# Patient Record
Sex: MALE | Race: WHITE | NOT HISPANIC OR LATINO | Employment: FULL TIME | URBAN - METROPOLITAN AREA
[De-identification: names, ages, dates, MRNs, and addresses within clinical notes are randomized per-mention and may not be internally consistent; named-entity substitution may affect disease eponyms.]

---

## 2019-03-14 ENCOUNTER — HOSPITAL ENCOUNTER (OUTPATIENT)
Facility: HOSPITAL | Age: 39
Discharge: HOME | End: 2019-03-16
Attending: EMERGENCY MEDICINE | Admitting: SURGERY
Payer: COMMERCIAL

## 2019-03-14 DIAGNOSIS — K35.209 ACUTE APPENDICITIS WITH GENERALIZED PERITONITIS, UNSPECIFIED WHETHER ABSCESS PRESENT, UNSPECIFIED WHETHER GANGRENE PRESENT, UNSPECIFIED WHETHER PERFORATION PRESENT: Primary | ICD-10-CM

## 2019-03-14 LAB
ANION GAP SERPL CALC-SCNC: 9 MMOL/L
BASOPHILS # BLD AUTO: 0.02 K/UL
BASOPHILS NFR BLD: 0.2 %
BILIRUB UR QL STRIP: NEGATIVE
BUN SERPL-MCNC: 15 MG/DL
CALCIUM SERPL-MCNC: 9.2 MG/DL
CHLORIDE SERPL-SCNC: 102 MMOL/L
CLARITY UR: CLEAR
CO2 SERPL-SCNC: 26 MMOL/L
COLOR UR: YELLOW
CREAT SERPL-MCNC: 0.9 MG/DL
DIFFERENTIAL METHOD: ABNORMAL
EOSINOPHIL # BLD AUTO: 0.2 K/UL
EOSINOPHIL NFR BLD: 2.1 %
ERYTHROCYTE [DISTWIDTH] IN BLOOD BY AUTOMATED COUNT: 12.5 %
EST. GFR  (AFRICAN AMERICAN): >60 ML/MIN/1.73 M^2
EST. GFR  (NON AFRICAN AMERICAN): >60 ML/MIN/1.73 M^2
GLUCOSE SERPL-MCNC: 104 MG/DL
GLUCOSE UR QL STRIP: NEGATIVE
HCT VFR BLD AUTO: 45.8 %
HGB BLD-MCNC: 16 G/DL
HGB UR QL STRIP: NEGATIVE
IMM GRANULOCYTES # BLD AUTO: 0.03 K/UL
IMM GRANULOCYTES NFR BLD AUTO: 0.3 %
KETONES UR QL STRIP: ABNORMAL
LEUKOCYTE ESTERASE UR QL STRIP: NEGATIVE
LYMPHOCYTES # BLD AUTO: 3.1 K/UL
LYMPHOCYTES NFR BLD: 27.8 %
MCH RBC QN AUTO: 28.3 PG
MCHC RBC AUTO-ENTMCNC: 34.9 G/DL
MCV RBC AUTO: 81 FL
MONOCYTES # BLD AUTO: 0.6 K/UL
MONOCYTES NFR BLD: 5.3 %
NEUTROPHILS # BLD AUTO: 7.1 K/UL
NEUTROPHILS NFR BLD: 64.3 %
NITRITE UR QL STRIP: NEGATIVE
NRBC BLD-RTO: 0 /100 WBC
PH UR STRIP: 6 [PH] (ref 5–8)
PLATELET # BLD AUTO: 227 K/UL
PMV BLD AUTO: 9.1 FL
POTASSIUM SERPL-SCNC: 3.6 MMOL/L
PROT UR QL STRIP: NEGATIVE
RBC # BLD AUTO: 5.65 M/UL
SODIUM SERPL-SCNC: 137 MMOL/L
SP GR UR STRIP: >=1.03 (ref 1–1.03)
URN SPEC COLLECT METH UR: ABNORMAL
UROBILINOGEN UR STRIP-ACNC: NEGATIVE EU/DL
WBC # BLD AUTO: 11.06 K/UL

## 2019-03-14 PROCEDURE — 81003 URINALYSIS AUTO W/O SCOPE: CPT

## 2019-03-14 PROCEDURE — 74176 CT RENAL STONE STUDY ABD PELVIS WO: ICD-10-PCS | Mod: 26,,, | Performed by: RADIOLOGY

## 2019-03-14 PROCEDURE — 74176 CT ABD & PELVIS W/O CONTRAST: CPT | Mod: TC

## 2019-03-14 PROCEDURE — 99285 EMERGENCY DEPT VISIT HI MDM: CPT

## 2019-03-14 PROCEDURE — 80048 BASIC METABOLIC PNL TOTAL CA: CPT

## 2019-03-14 PROCEDURE — G0378 HOSPITAL OBSERVATION PER HR: HCPCS

## 2019-03-14 PROCEDURE — 74176 CT ABD & PELVIS W/O CONTRAST: CPT | Mod: 26,,, | Performed by: RADIOLOGY

## 2019-03-14 PROCEDURE — 85025 COMPLETE CBC W/AUTO DIFF WBC: CPT

## 2019-03-14 RX ORDER — ONDANSETRON 2 MG/ML
4 INJECTION INTRAMUSCULAR; INTRAVENOUS EVERY 8 HOURS PRN
Status: DISCONTINUED | OUTPATIENT
Start: 2019-03-14 | End: 2019-03-16 | Stop reason: HOSPADM

## 2019-03-14 RX ORDER — SODIUM CHLORIDE, SODIUM LACTATE, POTASSIUM CHLORIDE, CALCIUM CHLORIDE 600; 310; 30; 20 MG/100ML; MG/100ML; MG/100ML; MG/100ML
INJECTION, SOLUTION INTRAVENOUS CONTINUOUS
Status: DISCONTINUED | OUTPATIENT
Start: 2019-03-14 | End: 2019-03-14

## 2019-03-14 RX ORDER — SODIUM CHLORIDE 0.9 % (FLUSH) 0.9 %
5 SYRINGE (ML) INJECTION
Status: DISCONTINUED | OUTPATIENT
Start: 2019-03-14 | End: 2019-03-16 | Stop reason: HOSPADM

## 2019-03-14 RX ORDER — MORPHINE SULFATE 4 MG/ML
4 INJECTION, SOLUTION INTRAMUSCULAR; INTRAVENOUS EVERY 4 HOURS PRN
Status: DISCONTINUED | OUTPATIENT
Start: 2019-03-14 | End: 2019-03-16 | Stop reason: HOSPADM

## 2019-03-14 RX ORDER — FAMOTIDINE 10 MG/ML
20 INJECTION INTRAVENOUS EVERY 12 HOURS
Status: DISCONTINUED | OUTPATIENT
Start: 2019-03-14 | End: 2019-03-16 | Stop reason: HOSPADM

## 2019-03-14 RX ORDER — SODIUM CHLORIDE AND POTASSIUM CHLORIDE 150; 900 MG/100ML; MG/100ML
INJECTION, SOLUTION INTRAVENOUS CONTINUOUS
Status: DISCONTINUED | OUTPATIENT
Start: 2019-03-15 | End: 2019-03-16 | Stop reason: HOSPADM

## 2019-03-15 ENCOUNTER — ANESTHESIA (OUTPATIENT)
Dept: SURGERY | Facility: HOSPITAL | Age: 39
End: 2019-03-15
Payer: COMMERCIAL

## 2019-03-15 ENCOUNTER — ANESTHESIA EVENT (OUTPATIENT)
Dept: SURGERY | Facility: HOSPITAL | Age: 39
End: 2019-03-15
Payer: COMMERCIAL

## 2019-03-15 PROCEDURE — 25000003 PHARM REV CODE 250: Performed by: ANESTHESIOLOGY

## 2019-03-15 PROCEDURE — 71000033 HC RECOVERY, INTIAL HOUR: Performed by: SURGERY

## 2019-03-15 PROCEDURE — 37000008 HC ANESTHESIA 1ST 15 MINUTES: Performed by: SURGERY

## 2019-03-15 PROCEDURE — 63600175 PHARM REV CODE 636 W HCPCS: Performed by: ANESTHESIOLOGY

## 2019-03-15 PROCEDURE — 96361 HYDRATE IV INFUSION ADD-ON: CPT | Performed by: EMERGENCY MEDICINE

## 2019-03-15 PROCEDURE — 96375 TX/PRO/DX INJ NEW DRUG ADDON: CPT | Performed by: EMERGENCY MEDICINE

## 2019-03-15 PROCEDURE — 88304 TISSUE EXAM BY PATHOLOGIST: CPT | Mod: 26,,, | Performed by: PATHOLOGY

## 2019-03-15 PROCEDURE — 96374 THER/PROPH/DIAG INJ IV PUSH: CPT | Performed by: EMERGENCY MEDICINE

## 2019-03-15 PROCEDURE — S0028 INJECTION, FAMOTIDINE, 20 MG: HCPCS | Performed by: EMERGENCY MEDICINE

## 2019-03-15 PROCEDURE — D9220A PRA ANESTHESIA: ICD-10-PCS | Mod: ANES,,, | Performed by: ANESTHESIOLOGY

## 2019-03-15 PROCEDURE — 36000709 HC OR TIME LEV III EA ADD 15 MIN: Performed by: SURGERY

## 2019-03-15 PROCEDURE — 88304 TISSUE SPECIMEN TO PATHOLOGY - SURGERY: ICD-10-PCS | Mod: 26,,, | Performed by: PATHOLOGY

## 2019-03-15 PROCEDURE — 27201423 OPTIME MED/SURG SUP & DEVICES STERILE SUPPLY: Performed by: SURGERY

## 2019-03-15 PROCEDURE — 25000003 PHARM REV CODE 250: Performed by: NURSE ANESTHETIST, CERTIFIED REGISTERED

## 2019-03-15 PROCEDURE — 44970 LAPAROSCOPY APPENDECTOMY: CPT | Mod: 22,,, | Performed by: SURGERY

## 2019-03-15 PROCEDURE — 36000708 HC OR TIME LEV III 1ST 15 MIN: Performed by: SURGERY

## 2019-03-15 PROCEDURE — D9220A PRA ANESTHESIA: Mod: CRNA,,, | Performed by: NURSE ANESTHETIST, CERTIFIED REGISTERED

## 2019-03-15 PROCEDURE — 63600175 PHARM REV CODE 636 W HCPCS: Performed by: INTERNAL MEDICINE

## 2019-03-15 PROCEDURE — G0378 HOSPITAL OBSERVATION PER HR: HCPCS

## 2019-03-15 PROCEDURE — 88304 TISSUE EXAM BY PATHOLOGIST: CPT | Performed by: PATHOLOGY

## 2019-03-15 PROCEDURE — 25000003 PHARM REV CODE 250: Performed by: SURGERY

## 2019-03-15 PROCEDURE — 37000009 HC ANESTHESIA EA ADD 15 MINS: Performed by: SURGERY

## 2019-03-15 PROCEDURE — 25000003 PHARM REV CODE 250: Performed by: EMERGENCY MEDICINE

## 2019-03-15 PROCEDURE — D9220A PRA ANESTHESIA: Mod: ANES,,, | Performed by: ANESTHESIOLOGY

## 2019-03-15 PROCEDURE — 63600175 PHARM REV CODE 636 W HCPCS: Performed by: EMERGENCY MEDICINE

## 2019-03-15 PROCEDURE — 63600175 PHARM REV CODE 636 W HCPCS: Performed by: NURSE ANESTHETIST, CERTIFIED REGISTERED

## 2019-03-15 PROCEDURE — 71000039 HC RECOVERY, EACH ADD'L HOUR: Performed by: SURGERY

## 2019-03-15 PROCEDURE — 44970 PR LAP,APPENDECTOMY: ICD-10-PCS | Mod: 22,,, | Performed by: SURGERY

## 2019-03-15 PROCEDURE — 96376 TX/PRO/DX INJ SAME DRUG ADON: CPT | Performed by: EMERGENCY MEDICINE

## 2019-03-15 PROCEDURE — 99204 OFFICE O/P NEW MOD 45 MIN: CPT | Mod: 57,,, | Performed by: SURGERY

## 2019-03-15 PROCEDURE — D9220A PRA ANESTHESIA: ICD-10-PCS | Mod: CRNA,,, | Performed by: NURSE ANESTHETIST, CERTIFIED REGISTERED

## 2019-03-15 PROCEDURE — 99204 PR OFFICE/OUTPT VISIT, NEW, LEVL IV, 45-59 MIN: ICD-10-PCS | Mod: 57,,, | Performed by: SURGERY

## 2019-03-15 RX ORDER — SODIUM CHLORIDE, SODIUM LACTATE, POTASSIUM CHLORIDE, CALCIUM CHLORIDE 600; 310; 30; 20 MG/100ML; MG/100ML; MG/100ML; MG/100ML
125 INJECTION, SOLUTION INTRAVENOUS CONTINUOUS
Status: DISCONTINUED | OUTPATIENT
Start: 2019-03-15 | End: 2019-03-16 | Stop reason: HOSPADM

## 2019-03-15 RX ORDER — ERTAPENEM 1 G/1
INJECTION, POWDER, LYOPHILIZED, FOR SOLUTION INTRAMUSCULAR; INTRAVENOUS
Status: DISCONTINUED | OUTPATIENT
Start: 2019-03-15 | End: 2019-03-15

## 2019-03-15 RX ORDER — MIDAZOLAM HYDROCHLORIDE 1 MG/ML
INJECTION, SOLUTION INTRAMUSCULAR; INTRAVENOUS
Status: DISCONTINUED | OUTPATIENT
Start: 2019-03-15 | End: 2019-03-15

## 2019-03-15 RX ORDER — SODIUM CHLORIDE 9 MG/ML
INJECTION, SOLUTION INTRAVENOUS CONTINUOUS PRN
Status: DISCONTINUED | OUTPATIENT
Start: 2019-03-15 | End: 2019-03-15

## 2019-03-15 RX ORDER — ONDANSETRON 2 MG/ML
4 INJECTION INTRAMUSCULAR; INTRAVENOUS DAILY PRN
Status: DISCONTINUED | OUTPATIENT
Start: 2019-03-15 | End: 2019-03-15 | Stop reason: SDUPTHER

## 2019-03-15 RX ORDER — MORPHINE SULFATE 4 MG/ML
2 INJECTION, SOLUTION INTRAMUSCULAR; INTRAVENOUS
Status: DISCONTINUED | OUTPATIENT
Start: 2019-03-15 | End: 2019-03-16 | Stop reason: HOSPADM

## 2019-03-15 RX ORDER — OXYCODONE AND ACETAMINOPHEN 5; 325 MG/1; MG/1
1 TABLET ORAL EVERY 4 HOURS PRN
Status: DISCONTINUED | OUTPATIENT
Start: 2019-03-15 | End: 2019-03-16 | Stop reason: HOSPADM

## 2019-03-15 RX ORDER — SUCCINYLCHOLINE CHLORIDE 20 MG/ML
INJECTION INTRAMUSCULAR; INTRAVENOUS
Status: DISCONTINUED | OUTPATIENT
Start: 2019-03-15 | End: 2019-03-15

## 2019-03-15 RX ORDER — PROMETHAZINE HYDROCHLORIDE 25 MG/ML
INJECTION, SOLUTION INTRAMUSCULAR; INTRAVENOUS
Status: DISPENSED
Start: 2019-03-15 | End: 2019-03-16

## 2019-03-15 RX ORDER — MEPERIDINE HYDROCHLORIDE 50 MG/ML
INJECTION INTRAMUSCULAR; INTRAVENOUS; SUBCUTANEOUS
Status: DISCONTINUED | OUTPATIENT
Start: 2019-03-15 | End: 2019-03-15

## 2019-03-15 RX ORDER — GLYCOPYRROLATE 0.2 MG/ML
INJECTION INTRAMUSCULAR; INTRAVENOUS
Status: DISCONTINUED | OUTPATIENT
Start: 2019-03-15 | End: 2019-03-15

## 2019-03-15 RX ORDER — MORPHINE SULFATE 4 MG/ML
2 INJECTION, SOLUTION INTRAMUSCULAR; INTRAVENOUS EVERY 5 MIN PRN
Status: COMPLETED | OUTPATIENT
Start: 2019-03-15 | End: 2019-03-15

## 2019-03-15 RX ORDER — BUPIVACAINE HYDROCHLORIDE AND EPINEPHRINE 5; 5 MG/ML; UG/ML
INJECTION, SOLUTION EPIDURAL; INTRACAUDAL; PERINEURAL
Status: DISCONTINUED | OUTPATIENT
Start: 2019-03-15 | End: 2019-03-15 | Stop reason: HOSPADM

## 2019-03-15 RX ORDER — SODIUM CHLORIDE, SODIUM LACTATE, POTASSIUM CHLORIDE, CALCIUM CHLORIDE 600; 310; 30; 20 MG/100ML; MG/100ML; MG/100ML; MG/100ML
INJECTION, SOLUTION INTRAVENOUS CONTINUOUS
Status: DISCONTINUED | OUTPATIENT
Start: 2019-03-15 | End: 2019-03-15

## 2019-03-15 RX ORDER — LIDOCAINE HYDROCHLORIDE 10 MG/ML
1 INJECTION, SOLUTION EPIDURAL; INFILTRATION; INTRACAUDAL; PERINEURAL ONCE
Status: DISCONTINUED | OUTPATIENT
Start: 2019-03-15 | End: 2019-03-15 | Stop reason: HOSPADM

## 2019-03-15 RX ORDER — CISATRACURIUM BESYLATE 2 MG/ML
INJECTION, SOLUTION INTRAVENOUS
Status: DISCONTINUED | OUTPATIENT
Start: 2019-03-15 | End: 2019-03-15

## 2019-03-15 RX ORDER — PROPOFOL 10 MG/ML
VIAL (ML) INTRAVENOUS
Status: DISCONTINUED | OUTPATIENT
Start: 2019-03-15 | End: 2019-03-15

## 2019-03-15 RX ORDER — DIPHENHYDRAMINE HYDROCHLORIDE 50 MG/ML
12.5 INJECTION INTRAMUSCULAR; INTRAVENOUS
Status: DISCONTINUED | OUTPATIENT
Start: 2019-03-15 | End: 2019-03-15 | Stop reason: HOSPADM

## 2019-03-15 RX ORDER — ONDANSETRON 2 MG/ML
4 INJECTION INTRAMUSCULAR; INTRAVENOUS EVERY 6 HOURS PRN
Status: DISCONTINUED | OUTPATIENT
Start: 2019-03-15 | End: 2019-03-16 | Stop reason: HOSPADM

## 2019-03-15 RX ADMIN — ERTAPENEM SODIUM 1 G: 1 INJECTION, POWDER, LYOPHILIZED, FOR SOLUTION INTRAMUSCULAR; INTRAVENOUS at 11:03

## 2019-03-15 RX ADMIN — POTASSIUM CHLORIDE AND SODIUM CHLORIDE 1000 ML: 900; 150 INJECTION, SOLUTION INTRAVENOUS at 01:03

## 2019-03-15 RX ADMIN — OXYCODONE HYDROCHLORIDE AND ACETAMINOPHEN 1 TABLET: 5; 325 TABLET ORAL at 02:03

## 2019-03-15 RX ADMIN — MORPHINE SULFATE 2 MG: 4 INJECTION INTRAVENOUS at 01:03

## 2019-03-15 RX ADMIN — OXYCODONE HYDROCHLORIDE AND ACETAMINOPHEN 1 TABLET: 5; 325 TABLET ORAL at 07:03

## 2019-03-15 RX ADMIN — MORPHINE SULFATE 2 MG: 4 INJECTION INTRAVENOUS at 02:03

## 2019-03-15 RX ADMIN — PROMETHAZINE HYDROCHLORIDE 6.25 MG: 25 INJECTION INTRAMUSCULAR; INTRAVENOUS at 01:03

## 2019-03-15 RX ADMIN — ERTAPENEM SODIUM 1 G: 1 INJECTION, POWDER, LYOPHILIZED, FOR SOLUTION INTRAMUSCULAR; INTRAVENOUS at 01:03

## 2019-03-15 RX ADMIN — GLYCOPYRROLATE 0.4 MG: 0.2 INJECTION INTRAMUSCULAR; INTRAVENOUS at 11:03

## 2019-03-15 RX ADMIN — FAMOTIDINE 20 MG: 10 INJECTION INTRAVENOUS at 08:03

## 2019-03-15 RX ADMIN — FAMOTIDINE 20 MG: 10 INJECTION INTRAVENOUS at 09:03

## 2019-03-15 RX ADMIN — SODIUM CHLORIDE: 0.9 INJECTION, SOLUTION INTRAVENOUS at 11:03

## 2019-03-15 RX ADMIN — PROPOFOL 200 MG: 10 INJECTION, EMULSION INTRAVENOUS at 11:03

## 2019-03-15 RX ADMIN — SODIUM CHLORIDE, POTASSIUM CHLORIDE, SODIUM LACTATE AND CALCIUM CHLORIDE: 600; 310; 30; 20 INJECTION, SOLUTION INTRAVENOUS at 12:03

## 2019-03-15 RX ADMIN — FAMOTIDINE 20 MG: 10 INJECTION INTRAVENOUS at 01:03

## 2019-03-15 RX ADMIN — MEPERIDINE HYDROCHLORIDE 50 MG: 50 INJECTION INTRAMUSCULAR; INTRAVENOUS; SUBCUTANEOUS at 11:03

## 2019-03-15 RX ADMIN — MIDAZOLAM HYDROCHLORIDE 2 MG: 1 INJECTION, SOLUTION INTRAMUSCULAR; INTRAVENOUS at 11:03

## 2019-03-15 RX ADMIN — CISATRACURIUM BESYLATE 6 MG: 2 INJECTION INTRAVENOUS at 11:03

## 2019-03-15 RX ADMIN — POTASSIUM CHLORIDE AND SODIUM CHLORIDE 1000 ML: 900; 150 INJECTION, SOLUTION INTRAVENOUS at 02:03

## 2019-03-15 RX ADMIN — SUCCINYLCHOLINE CHLORIDE 100 MG: 20 INJECTION, SOLUTION INTRAMUSCULAR; INTRAVENOUS at 11:03

## 2019-03-15 NOTE — NURSING
Pt resting at this time.  MD at bedside.  VSS, NAD.  Pt without complaints or changes in status.  Remains NPO for procedure this am.

## 2019-03-15 NOTE — PLAN OF CARE
Problem: Adult Inpatient Plan of Care  Goal: Plan of Care Review   03/15/19 0201   Plan of Care Review   Plan of Care Reviewed With patient

## 2019-03-15 NOTE — OP NOTE
[facility] - Periop Services  Operative Note     SUMMARY     Surgery Date: 3/15/2019     Surgeon(s) and Role:     * Zeferino Qureshi MD - Primary    Assistant: Christiano    Pre-op Diagnosis:  Acute appendicitis with generalized peritonitis without gangrene, unspecified whether abscess present, unspecified whether perforation present [K35.20]    Post-op Diagnosis:  Post-Op Diagnosis Codes:     * Acute appendicitis with generalized peritonitis without gangrene, unspecified whether abscess present, unspecified whether perforation present [K35.20]    Procedure(s) (LRB):  APPENDECTOMY, LAPAROSCOPIC (Right)    Modifier 22: This case was significantly harder than 75% of other appendectomies.  Acute on chronic appendicitis with significant retroperitoneal inflammation and scarring and the retrocecal nature of the base of the appendix caused this case to be increased time and more meticulous dissection.  Therefore I am adding the 22 modifier.    Description of the findings of the procedure:  Acute on chronic appendicitis with periappendicitis with retrocecal nature of the appendicial base.      Estimated Blood Loss: 25cc         Specimens:   Specimen (12h ago, onward)    Start     Ordered    03/15/19 1223  Specimen to Pathology - Surgery  Once     Comments:  Pre-op Diagnosis: Acute appendicitis with generalized peritonitis without gangrene, unspecified whether abscess present, unspecified whether perforation present [K35.20]Post-op Diagnosis: same Procedure(s):APPENDECTOMY, LAPAROSCOPIC Number of specimens: 1Name of specimens: 1. appendix     Start Status   03/15/19 1223 Collected (03/15/19 1224)       03/15/19 1223        Abx: Invanz 1 gram IV    Anesthesia: General, please see separate anesthesia note    DATE OF PROCEDURE:  03/15/2019.    INDICATIONS FOR PROCEDURE:  Mr. Dimas is a 38-year-old male who presented  to the ER last night with evidence of right lower quadrant pain for less  than 24-hour  duration.  CT scan confirmed appendicitis.  The patient was  admitted to the Hospital Medicine coffey and the Medical Team treated with IV  fluids, antibiotics and I was the surgeon on duty this morning, was called  in consultation.  Risks and benefits of laparoscopic versus open  appendectomy were discussed in detail.  The patient understood risks and  benefits and wished to proceed today by signing informed consent.    PROCEDURE IN DETAIL:  The patient was seen in preoperative holding.  All  questions were answered.  The patient was taken back to the Operative Room,  placed on the table in supine position.  General anesthesia was introduced  via endotracheal tube per Anesthesia, please see separate operative record.   The patient's left arm was tucked.  OG tube was placed.  Steven was placed.   The patient's abdomen was prepped and draped in standard sterile surgical  fashion.    I then instilled 10 mL of 0.25% Marcaine with epinephrine in the patient's  proposed surgical incision sites.  A curvilinear incision was made around  the superior umbilicus.  Skin incision was carried down to the fascia with  Bovie electrocautery.  Towel clips were used to elevate the umbilical  stalk.  Fascia was incised superiorly in a vertical fashion.  Finger sweep  was done after fascial entry in the abdominal wall and had no evidence of  adhesions to the anterior abdominal wall.  Ezekiel trocar was then placed.   Insufflation was achieved to 15 mmHg without incident of bradycardia from  the patient.  The patient was then placed in a head down, left lateral row  position.  Two additional 5 mm trocars were placed in the left lower  quadrant under direct visualization.  The small bowel was swept out of the  pelvis.  The appendix was visualized.  It was on the right pericolic gutter  with significant acute on chronic inflammatory changes to it.    The tip of the appendix was then grasped with a bowel grasper.  It was  elevated.  Meticulous  dissection was done of the mesoappendix with a  LigaSure device.  The appendix was then visualized.  The appendix was  dissected from the mesoappendix with LigaSure device.  The right colon at  the cecum was mobilized from the white line of Toldt given the significant  inflammatory changes to the appendix and needing more mobilization for  identification of the appendiceal base of the cecum.  Continued meticulous  dissection was performed of the mesoappendix from the appendix and its  chronic inflammatory changes to the right pericolic gutter as well as acute  inflammatory changes.  Eventually, the base of the appendix was noted.   This was a retrocecal location.  The cecum was mobilized more to allow for  adequate visualization of the base of the appendix.  The base of the  appendix was cleaned off with the LigaSure device.  At this point, a purple  load triload stapler was used, 45 mm placed on the base of the appendix and  fired.  The appendix was then placed in an EndoCatch bag and pulled out  through the umbilicus and handed off as specimen.  Attention was then  turned towards the appendiceal base staple line.  This appeared well  intact.  No evidence of hemorrhage from the staple line.  No evidence of  staple line dehiscence.  The right lower quadrant was washed out with   irrigant copiously.  The pelvis was then visualized.  There was no fluid  within the pelvis.  Insufflation was then released from the abdomen.   Ezekiel trocar and 5 mm trocars were removed.  The 5 mm trocars were removed  under direct visualization.  There was no hemorrhage from the trocar sites.   Ezekiel trocar was then removed.  The patient was then turned in the normal  anatomic position with the head up and back to supine position without a  roll.  A towel clip was then placed back on the umbilical stalk.  The  umbilical stalk was elevated.  Fascia was identified at the Ezekiel site.  0  Vicryl suture was used in a simple interrupted  fashion to close the fascia  with adequate elevation with the towel clip.  All sutures were placed and  tied down to the midline.  After all sutures were placed, approximately 5  to 6 sutures placed.  The fascia was well approximated without evidence of  hernia.  The umbilical site was then washed out with  irrigant.  A 3-0  Vicryl suture was used in simple subdermal fashion to close the subdermis  in a simple interrupted fashion, 3-0 Vicryl sutures were used at the 5 mm  trocar sites to close the skin.  A 4-0 Vicryl suture was used in a running  subcuticular fashion to close the umbilical port sites, epidermis.   Dermabond was placed over top of all surgical sites as a dressing.  The  patient tolerated the procedure well, extubated, sent from the OR after  reversed from general anesthesia. Steven was removed.  OG tube was removed.   The patient was transferred back to the Postoperative Care Unit in stable  condition.  All counts were correct at the end of the procedure including  lap pads, instruments as well as needles.  Plan will be for readmission to  the floor, continue IV fluids, antibiotics for the next 24 hours.  Possible  discharge home, pending clinical status.          SDR/IN dd: 03/15/2019 12:59:22 (CDT)   td: 03/15/2019 13:33:46 (CDT)  Doc ID #[id number]   Job ID #[id number]    CC:            [id number]

## 2019-03-15 NOTE — PROGRESS NOTES
[facility] Medicine  Progress Note    Patient Name: Elpidio Dimas  MRN: [id number]  Patient Class: OP- Observation   Admission Date: 3/14/2019  Length of Stay: 0 days  Attending Physician: Itz Dalal MD  Primary Care Provider: Itz Dalal MD        Subjective:     Principal Problem:Acute appendicitis with generalized peritonitis    HPI:  Awoke this am with hard cramp rlq which has improved now 3/10     Hospital Course:  ivfs iv pain meds surgery consult  03/15/2019.  Patient has been placed on the surgical schedule will have a laparoscopic appendectomy later today.  Patient on IV antibiotics CT scan x-rays and labs have been reviewed.  Discussed with patient and wife timing of surgical procedure today.  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  Depending on surgical findings at the time of surgery whether not the patient will be held over tonight to receive continued antibiotics.    Interval History:  Patient has done well over the p.m..  He is on the surgical schedule today to follow.  Patient is sitting in bed resting quietly his wife is in the room with him we discussed upcoming surgery.  Answered all questions for the patient and the wife and questions concerning work.  Continue current level of care unless clinical course changes.    Review of Systems   Constitutional: Positive for appetite change. Negative for activity change, chills, diaphoresis, fatigue, fever and unexpected weight change.   HENT: Negative for congestion, drooling, hearing loss and trouble swallowing.    Eyes: Negative for pain and visual disturbance.   Respiratory: Negative.  Negative for apnea, cough, choking, chest tightness, shortness of breath and wheezing.    Cardiovascular: Negative for chest pain, palpitations and leg  swelling.   Gastrointestinal: Positive for abdominal pain. Negative for abdominal distention, blood in stool, constipation, diarrhea, nausea and vomiting.        Rlq abd pain over appendix   Endocrine: Negative for polydipsia, polyphagia and polyuria.   Genitourinary: Negative for difficulty urinating, dysuria and flank pain.   Musculoskeletal: Negative for arthralgias, back pain, gait problem, joint swelling, neck pain and neck stiffness.   Skin: Negative for color change, rash and wound.   Allergic/Immunologic: Negative for environmental allergies, food allergies and immunocompromised state.   Neurological: Negative for dizziness, syncope, weakness, numbness and headaches.   Hematological: Negative for adenopathy.   Psychiatric/Behavioral: Negative for agitation, confusion and sleep disturbance. The patient is not nervous/anxious.      Objective:     Vital Signs (Most Recent):  Temp: 97.2 °F (36.2 °C) (03/15/19 0730)  Pulse: 65 (03/15/19 0730)  Resp: 18 (03/15/19 0730)  BP: (!) 162/93 (03/15/19 0730)  SpO2: 99 % (03/15/19 0730) Vital Signs (24h Range):  Temp:  [96.8 °F (36 °C)-99.7 °F (37.6 °C)] 97.2 °F (36.2 °C)  Pulse:  [51-74] 65  Resp:  [18] 18  SpO2:  [97 %-99 %] 99 %  BP: (121-162)/(68-97) 162/93     Weight: 129.9 kg (286 lb 6 oz)  Body mass index is 39.94 kg/m².    Intake/Output Summary (Last 24 hours) at 3/15/2019 0761  Last data filed at 3/15/2019 0546  Gross per 24 hour   Intake 600 ml   Output --   Net 600 ml      Physical Exam   Constitutional: He is oriented to person, place, and time. He appears well-developed and well-nourished.   HENT:   Head: Normocephalic and atraumatic.   Right Ear: External ear normal.   Left Ear: External ear normal.   Nose: Nose normal.   Eyes: Conjunctivae, EOM and lids are normal. Pupils are equal, round, and reactive to light.   Neck: Trachea normal, normal range of motion and full passive range of motion without pain. Neck supple.   Cardiovascular: Normal rate, regular  rhythm, S1 normal, S2 normal, normal heart sounds, intact distal pulses and normal pulses.   Pulmonary/Chest: Effort normal and breath sounds normal.   Abdominal: Soft. Normal aorta and bowel sounds are normal. There is tenderness. There is rebound and guarding.   RLQ   Musculoskeletal: Normal range of motion.   Neurological: He is alert and oriented to person, place, and time. He has normal reflexes.   Skin: Skin is warm, dry and intact.   Psychiatric: He has a normal mood and affect. His speech is normal and behavior is normal. Judgment and thought content normal. Cognition and memory are normal.   Nursing note and vitals reviewed.      Significant Labs:   BMP:   Recent Labs   Lab 03/14/19 2006         K 3.6      CO2 26   BUN 15   CREATININE 0.9   CALCIUM 9.2     CBC:   Recent Labs   Lab 03/14/19 2006   WBC 11.06   HGB 16.0   HCT 45.8        All pertinent labs within the past 24 hours have been reviewed.    Significant Imaging: I have reviewed and interpreted all pertinent imaging results/findings within the past 24 hours.    Assessment/Plan:      * Acute appendicitis with generalized peritonitis    ivfs iv pain [facility] surgery consult  03/15/2019.  Patient is awaiting surgery at this time.  All questions for the patient and wife were answered.  Surgical findings will determine if the patient stays overnight for continued antibiotics.  Continue current level of care unless clinical course changes.         VTE Risk Mitigation (From admission, onward)        Ordered     IP VTE LOW RISK PATIENT  Once      03/14/19 2256     Place RANDOLPH hose  Until discontinued      03/14/19 2256              Itz Dalal MD  Department of Hospital Medicine   [facility] - Med Surg

## 2019-03-15 NOTE — BRIEF OP NOTE
[facility] [place] [facility] - Periop Services  Brief Operative Note     SUMMARY     Surgery Date: 3/15/2019     Surgeon(s) and Role:     * Zeferino Qureshi MD - Primary    Assistant: Christiano    Pre-op Diagnosis:  Acute appendicitis with generalized peritonitis without gangrene, unspecified whether abscess present, unspecified whether perforation present [K35.20]    Post-op Diagnosis:  Post-Op Diagnosis Codes:     * Acute appendicitis with generalized peritonitis without gangrene, unspecified whether abscess present, unspecified whether perforation present [K35.20]    Procedure(s) (LRB):  APPENDECTOMY, LAPAROSCOPIC (Right)    Modifier 22: This case was significantly harder than 75% of other appendectomies.  Acute on chronic appendicitis with significant retroperitoneal inflammation and scarring and the retrocecal nature of the base of the appendix caused this case to be increased time and more meticulous dissection.  Therefore I am adding the 22 modifier.    Description of the findings of the procedure:  Acute on chronic appendicitis with periappendicitis with retrocecal nature of the appendicial base.      Estimated Blood Loss: 25cc         Specimens:   Specimen (12h ago, onward)    Start     Ordered    03/15/19 1223  Specimen to Pathology - Surgery  Once     Comments:  Pre-op Diagnosis: Acute appendicitis with generalized peritonitis without gangrene, unspecified whether abscess present, unspecified whether perforation present [K35.20]Post-op Diagnosis: same Procedure(s):APPENDECTOMY, LAPAROSCOPIC Number of specimens: 1Name of specimens: 1. appendix     Start Status   03/15/19 1223 Collected (03/15/19 1224)       03/15/19 1223        Abx: Invanz 1 gram IV    Anesthesia: General, please see separate anesthesia note

## 2019-03-15 NOTE — HOSPITAL COURSE
ivfs iv pain meds surgery consult  03/15/2019.  Patient has been placed on the surgical schedule will have a laparoscopic appendectomy later today.  Patient on IV antibiotics CT scan x-rays and labs have been reviewed.  Discussed with patient and wife timing of surgical procedure today.  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  Depending on surgical findings at the time of surgery whether not the patient will be held over tonight to receive continued antibiotics.  03/16/2019.  Patient is stable afebrile having no problems.  Has had diet.  Can be discharged home today follow up in office with Dr. FALL General surgery.  and with me.

## 2019-03-15 NOTE — ANESTHESIA PREPROCEDURE EVALUATION
03/15/2019  Elpidio Dimas is a 38 y.o., male.    Pre-op Assessment    I have reviewed the Patient Summary Reports.     I have reviewed the Nursing Notes.   I have reviewed the Medications.     Review of Systems  Anesthesia Hx:  No problems with previous Anesthesia    Social:  Non-Smoker    Hematology/Oncology:  Hematology Normal   Oncology Normal     EENT/Dental:EENT/Dental Normal   Cardiovascular:  Cardiovascular Normal     Pulmonary:  Pulmonary Normal    Renal/:  Renal/ Normal     Hepatic/GI:  Hepatic/GI Normal    Musculoskeletal:  Musculoskeletal Normal    Neurological:  Neurology Normal    Psych:  Psychiatric Normal           Physical Exam  General:  Well nourished    Airway/Jaw/Neck:  Airway Findings: Mouth Opening: Normal Tongue: Normal  General Airway Assessment: Adult  Mallampati: II       Chest/Lungs:  Chest/Lungs Findings: Clear to auscultation     Heart/Vascular:  Heart Findings: Rate: Normal        Mental Status:  Mental Status Findings:  Cooperative, Alert and Oriented         Anesthesia Plan  Type of Anesthesia, risks & benefits discussed:  Anesthesia Type:  general  Patient's Preference:   Intra-op Monitoring Plan: standard ASA monitors  Intra-op Monitoring Plan Comments:   Post Op Pain Control Plan:   Post Op Pain Control Plan Comments:   Induction:    Beta Blocker:  Patient is not currently on a Beta-Blocker (No further documentation required).       Informed Consent: Patient understands risks and agrees with Anesthesia plan.  Questions answered. Anesthesia consent signed with patient.  ASA Score: 2     Day of Surgery Review of History & Physical: I have interviewed and examined the patient. I have reviewed the patient's H&P dated:

## 2019-03-15 NOTE — PLAN OF CARE
1415- transported to floor per bed. assisted with transfer to bed per stand and turn. Tolerated activity well. Continued pain management as needed. Report given to Jina Espinoza RN .

## 2019-03-15 NOTE — PLAN OF CARE
03/15/19 1530   Discharge Assessment   Assessment Type Discharge Planning Assessment   Confirmed/corrected address and phone number on facesheet? Yes   Assessment information obtained from? Patient   Expected Length of Stay (days) 2   Communicated expected length of stay with patient/caregiver yes   Prior to hospitilization cognitive status: Alert/Oriented   Prior to hospitalization functional status: Independent   Current cognitive status: Alert/Oriented   Current Functional Status: Independent   Lives With spouse;child(denzel), dependent   Able to Return to Prior Arrangements yes   Is patient able to care for self after discharge? Yes   Who are your caregiver(s) and their phone number(s)? Yoselin Dimas spouse [phone]   Patient's perception of discharge disposition home or selfcare   Readmission Within the Last 30 Days no previous admission in last 30 days   Patient currently being followed by outpatient case management? No   Patient currently receives any other outside agency services? No   Equipment Currently Used at Home none   Do you have any problems affording any of your prescribed medications? No   Is the patient taking medications as prescribed? yes   Does the patient have transportation home? Yes   Transportation Anticipated family or friend will provide   Does the patient receive services at the Coumadin Clinic? No   Discharge Plan A Home with family   DME Needed Upon Discharge  none   Patient/Family in Agreement with Plan yes   Patient lives at home with his wife & 6yr old daughter. Patient is independent at home. Wife states she will be at home to help him out. Denies any discharge needs at this time.

## 2019-03-15 NOTE — PLAN OF CARE
Report from Bharati HWANG for continued care of quietly resting pt on room air. Vss surgical lap sites x 3 visualized unchanged.WEll approximated with no drainage

## 2019-03-15 NOTE — DISCHARGE INSTRUCTIONS
After an Appendectomy     You will start walking very soon after surgery. Walking helps you recover faster and helps prevent complications.    Most people recover quickly after an appendectomy. You will likely be in the hospital for 1 to 2 days. If your appendix burst, you may stay longer. After you return home, plan on a follow-up visit with your healthcare provider in 1 to 2 weeks, or as instructed.  In the hospital  In most cases, you will drink liquids and walk on the day of the surgery. You will also be given pain medicine. To help keep your lungs clear, you may be taught breathing exercises.  Back at home  Take your medicines as directed. They can help control pain from the surgery. Avoid strenuous activity, heavy lifting, and driving until your surgeon says it is OK. As directed, slowly go back to your normal activities in 7 to 10 days.  When to call your provider  Call your healthcare provider if you have any of the following:  · Swelling, oozing, worsening pain, or abnormal redness near the incision  · A fever of 100.4°F (38°C) or higher, or as directed by your healthcare provider  · Belly pain that gets worse  · Severe diarrhea, bloating, or constipation  · Upset stomach (nausea) or vomiting   Date Last Reviewed: 7/1/2016  © 7920-2606 [employer]. [address] [place] [address], [place] [address]. All rights reserved. This information is not intended as a substitute for professional medical care. Always follow your healthcare professional's instructions.

## 2019-03-15 NOTE — ASSESSMENT & PLAN NOTE
ivfs iv pain meds surgery consult  03/15/2019.  Patient is awaiting surgery at this time.  All questions for the patient and wife were answered.  Surgical findings will determine if the patient stays overnight for continued antibiotics.  Continue current level of care unless clinical course changes.

## 2019-03-15 NOTE — TRANSFER OF CARE
"Anesthesia Transfer of Care Note    Patient: Elpidio Dimas    Procedure(s) Performed: Procedure(s) (LRB):  APPENDECTOMY, LAPAROSCOPIC (Right)    Patient location: PACU    Anesthesia Type: general    Transport from OR: Transported from OR on room air with adequate spontaneous ventilation    Post pain: adequate analgesia    Post assessment: no apparent anesthetic complications and tolerated procedure well    Post vital signs: stable    Level of consciousness: awake, alert and oriented    Nausea/Vomiting: no nausea/vomiting    Complications: none    Transfer of care protocol was followed      Last vitals:   Visit Vitals  /81 (BP Location: Left arm, Patient Position: Lying)   Pulse 81   Temp 36.8 °C (98.2 °F) (Oral)   Resp 20   Ht 5' 11" (1.803 m)   Wt 129.7 kg (286 lb)   SpO2 99%   BMI 39.89 kg/m²     "

## 2019-03-15 NOTE — PLAN OF CARE
Patient awake and alert. VSS. IV intact and infusing. Trochar sites assessed. Clean dry and intact. Pain being controlled with medication. (SEE MAR).

## 2019-03-15 NOTE — SUBJECTIVE & OBJECTIVE
History reviewed. No pertinent past medical history.    Past Surgical History:   Procedure Laterality Date    UNDESCENDED TESTICLE EXPLORATION         Review of patient's allergies indicates:  No Known Allergies    No current facility-administered medications on file prior to encounter.      No current outpatient medications on file prior to encounter.     Family History     None        Tobacco Use    Smoking status: Never Smoker   Substance and Sexual Activity    Alcohol use: Yes     Comment: OCCASIONAL    Drug use: No    Sexual activity: Not on file     Review of Systems   Constitutional: Positive for appetite change. Negative for activity change, chills, diaphoresis, fatigue, fever and unexpected weight change.   HENT: Negative for congestion, drooling, hearing loss and trouble swallowing.    Eyes: Negative for pain and visual disturbance.   Respiratory: Negative.  Negative for apnea, cough, choking, chest tightness, shortness of breath and wheezing.    Cardiovascular: Negative for chest pain, palpitations and leg swelling.   Gastrointestinal: Positive for abdominal pain. Negative for abdominal distention, blood in stool, constipation, diarrhea, nausea and vomiting.        Rlq abd pain over appendix   Endocrine: Negative for polydipsia, polyphagia and polyuria.   Genitourinary: Negative for difficulty urinating, dysuria and flank pain.   Musculoskeletal: Negative for arthralgias, back pain, gait problem, joint swelling, neck pain and neck stiffness.   Skin: Negative for color change, rash and wound.   Allergic/Immunologic: Negative for environmental allergies, food allergies and immunocompromised state.   Neurological: Negative for dizziness, syncope, weakness, numbness and headaches.   Hematological: Negative for adenopathy.   Psychiatric/Behavioral: Negative for agitation, confusion and sleep disturbance. The patient is not nervous/anxious.      Objective:     Vital Signs (Most Recent):  Temp: 96.8 °F (36  °C) (03/14/19 2310)  Pulse: (!) 56 (03/14/19 2310)  Resp: 18 (03/14/19 2310)  BP: 121/72 (03/14/19 2310)  SpO2: 99 % (03/14/19 2310) Vital Signs (24h Range):  Temp:  [96.8 °F (36 °C)-99.7 °F (37.6 °C)] 96.8 °F (36 °C)  Pulse:  [56-74] 56  Resp:  [18] 18  SpO2:  [97 %-99 %] 99 %  BP: (121-150)/(72-97) 121/72     Weight: 124.7 kg (275 lb)  Body mass index is 38.35 kg/m².    Physical Exam   Constitutional: He is oriented to person, place, and time. He appears well-developed and well-nourished.   HENT:   Head: Normocephalic and atraumatic.   Right Ear: External ear normal.   Left Ear: External ear normal.   Nose: Nose normal.   Eyes: Conjunctivae, EOM and lids are normal. Pupils are equal, round, and reactive to light.   Neck: Trachea normal, normal range of motion and full passive range of motion without pain. Neck supple.   Cardiovascular: Normal rate, regular rhythm, S1 normal, S2 normal, normal heart sounds, intact distal pulses and normal pulses.   Pulmonary/Chest: Effort normal and breath sounds normal.   Abdominal: Soft. Normal aorta and bowel sounds are normal. There is tenderness. There is rebound and guarding.   RLQ   Musculoskeletal: Normal range of motion.   Neurological: He is alert and oriented to person, place, and time. He has normal reflexes.   Skin: Skin is warm, dry and intact.   Psychiatric: He has a normal mood and affect. His speech is normal and behavior is normal. Judgment and thought content normal. Cognition and memory are normal.   Nursing note and vitals reviewed.        CRANIAL NERVES     CN III, IV, VI   Pupils are equal, round, and reactive to light.  Extraocular motions are normal.        Significant Labs:   BMP:   Recent Labs   Lab 03/14/19 2006         K 3.6      CO2 26   BUN 15   CREATININE 0.9   CALCIUM 9.2     CBC:   Recent Labs   Lab 03/14/19 2006   WBC 11.06   HGB 16.0   HCT 45.8        Urine Studies:   Recent Labs   Lab 03/14/19 2037   COLORU Yellow    APPEARANCEUA Clear   PHUR 6.0   SPECGRAV >=1.030*   PROTEINUA Negative   GLUCUA Negative   KETONESU Trace*   BILIRUBINUA Negative   OCCULTUA Negative   NITRITE Negative   UROBILINOGEN Negative   LEUKOCYTESUR Negative     All pertinent labs within the past 24 hours have been reviewed.    Significant Imaging: CT: I have reviewed all pertinent results/findings within the past 24 hours and my personal findings are:  acute appendicitis  I have reviewed and interpreted all pertinent imaging results/findings within the past 24 hours.

## 2019-03-15 NOTE — H&P
[facility]  History & Physical    Patient Name: Elpidio Dimas  MRN: [id number]  Admission Date: 3/14/2019  Attending Physician: Itz Dalal MD   Primary Care Provider: Itz Dalal MD         Patient information was obtained from patient, relative(s) and ER records.     Subjective:     Principal Problem:Acute appendicitis with generalized peritonitis    Chief Complaint:   Chief Complaint   Patient presents with    Abdominal Pain     LOW MID ABD PAIN ONSET THIS MORNING, NOW RLQ, DENIES ANY OTHER SYMPTOMS        HPI: Awoke this am with hard cramp rlq which has improved now 3/10     History reviewed. No pertinent past medical history.    Past Surgical History:   Procedure Laterality Date    UNDESCENDED TESTICLE EXPLORATION         Review of patient's allergies indicates:  No Known Allergies    No current facility-administered medications on file prior to encounter.      No current outpatient medications on file prior to encounter.     Family History     None        Tobacco Use    Smoking status: Never Smoker   Substance and Sexual Activity    Alcohol use: Yes     Comment: OCCASIONAL    Drug use: No    Sexual activity: Not on file     Review of Systems   Constitutional: Positive for appetite change. Negative for activity change, chills, diaphoresis, fatigue, fever and unexpected weight change.   HENT: Negative for congestion, drooling, hearing loss and trouble swallowing.    Eyes: Negative for pain and visual disturbance.   Respiratory: Negative.  Negative for apnea, cough, choking, chest tightness, shortness of breath and wheezing.    Cardiovascular: Negative for chest pain, palpitations and leg swelling.   Gastrointestinal: Positive for abdominal pain. Negative for abdominal distention, blood in stool, constipation, diarrhea, nausea and vomiting.        Rlq abd pain over appendix   Endocrine: Negative for polydipsia, polyphagia and polyuria.    Genitourinary: Negative for difficulty urinating, dysuria and flank pain.   Musculoskeletal: Negative for arthralgias, back pain, gait problem, joint swelling, neck pain and neck stiffness.   Skin: Negative for color change, rash and wound.   Allergic/Immunologic: Negative for environmental allergies, food allergies and immunocompromised state.   Neurological: Negative for dizziness, syncope, weakness, numbness and headaches.   Hematological: Negative for adenopathy.   Psychiatric/Behavioral: Negative for agitation, confusion and sleep disturbance. The patient is not nervous/anxious.      Objective:     Vital Signs (Most Recent):  Temp: 96.8 °F (36 °C) (03/14/19 2310)  Pulse: (!) 56 (03/14/19 2310)  Resp: 18 (03/14/19 2310)  BP: 121/72 (03/14/19 2310)  SpO2: 99 % (03/14/19 2310) Vital Signs (24h Range):  Temp:  [96.8 °F (36 °C)-99.7 °F (37.6 °C)] 96.8 °F (36 °C)  Pulse:  [56-74] 56  Resp:  [18] 18  SpO2:  [97 %-99 %] 99 %  BP: (121-150)/(72-97) 121/72     Weight: 124.7 kg (275 lb)  Body mass index is 38.35 kg/m².    Physical Exam   Constitutional: He is oriented to person, place, and time. He appears well-developed and well-nourished.   HENT:   Head: Normocephalic and atraumatic.   Right Ear: External ear normal.   Left Ear: External ear normal.   Nose: Nose normal.   Eyes: Conjunctivae, EOM and lids are normal. Pupils are equal, round, and reactive to light.   Neck: Trachea normal, normal range of motion and full passive range of motion without pain. Neck supple.   Cardiovascular: Normal rate, regular rhythm, S1 normal, S2 normal, normal heart sounds, intact distal pulses and normal pulses.   Pulmonary/Chest: Effort normal and breath sounds normal.   Abdominal: Soft. Normal aorta and bowel sounds are normal. There is tenderness. There is rebound and guarding.   RLQ   Musculoskeletal: Normal range of motion.   Neurological: He is alert and oriented to person, place, and time. He has normal reflexes.   Skin: Skin  is warm, dry and intact.   Psychiatric: He has a normal mood and affect. His speech is normal and behavior is normal. Judgment and thought content normal. Cognition and memory are normal.   Nursing note and vitals reviewed.        CRANIAL NERVES     CN III, IV, VI   Pupils are equal, round, and reactive to light.  Extraocular motions are normal.        Significant Labs:   BMP:   Recent Labs   Lab 03/14/19 2006         K 3.6      CO2 26   BUN 15   CREATININE 0.9   CALCIUM 9.2     CBC:   Recent Labs   Lab 03/14/19 2006   WBC 11.06   HGB 16.0   HCT 45.8        Urine Studies:   Recent Labs   Lab 03/14/19 2037   COLORU Yellow   APPEARANCEUA Clear   PHUR 6.0   SPECGRAV >=1.030*   PROTEINUA Negative   GLUCUA Negative   KETONESU Trace*   BILIRUBINUA Negative   OCCULTUA Negative   NITRITE Negative   UROBILINOGEN Negative   LEUKOCYTESUR Negative     All pertinent labs within the past 24 hours have been reviewed.    Significant Imaging: CT: I have reviewed all pertinent results/findings within the past 24 hours and my personal findings are:  acute appendicitis  I have reviewed and interpreted all pertinent imaging results/findings within the past 24 hours.    Assessment/Plan:     * Acute appendicitis with generalized peritonitis    ivfs iv pain meds surgery consult         VTE Risk Mitigation (From admission, onward)        Ordered     IP VTE LOW RISK PATIENT  Once      03/14/19 2256     Place RANDOLPH hose  Until discontinued      03/14/19 2256             Itz Dalal MD  Department of Hospital Medicine   [facility]

## 2019-03-15 NOTE — PLAN OF CARE
Problem: Pain Acute  Goal: Optimal Pain Control    Intervention: Prevent or Manage Pain   03/15/19 0200   Prevent or Manage Pain   Sensory Stimulation Regulation care clustered;lighting decreased;quiet environment promoted;music/television provided for stimulation   Sleep/Rest Enhancement awakenings minimized;regular sleep/rest pattern promoted     Intervention: Optimize Psychosocial Wellbeing   03/15/19 0200   Support the Grieving Process   Spiritual Activities Assistance affirmation provided   Promote Anxiety Reduction   Supportive Measures active listening utilized

## 2019-03-15 NOTE — CONSULTS
[facility] - Med Surg  General Surgery  Consult Note    Patient Name: Elpidio Dimas  MRN: [id number]  Admission Date: 3/14/2019  Attending Physician: Itz Dalal MD   Consult Physician: Zeferino Qureshi MD  Primary Care Provider: Itz Dalal MD    Patient information was obtained from patient.     Subjective:     Chief Complaint/Reason for Admission: RLQ Abd Pain    History of Present Illness:    Elpidio Dimas is a 38 y.o. male with a history of undescended testicle requiring exploration as a kid presented to the ER last night with new onset right lower quadrant abdominal pain.  Patient stated that started yesterday afternoon.  Subsequently got worse which required ER visit.  Subjectively febrile.  No generalization of pain or symptoms.  Patient ER underwent CT scan evaluation which showed evidence of acute appendicitis.  Patient admitted to the medical coffey with IV fluids and antibiotics.  Today's surgeon on duty was called in consultation.    Review of patient's allergies indicates:  No Known Allergies    History reviewed. No pertinent past medical history.  Past Surgical History:   Procedure Laterality Date    UNDESCENDED TESTICLE EXPLORATION       Family History     None        Tobacco Use    Smoking status: Never Smoker   Substance and Sexual Activity    Alcohol use: Yes     Comment: OCCASIONAL    Drug use: No    Sexual activity: Not on file     Review of Systems   Constitutional: Positive for fever. Negative for appetite change and chills.   HENT: Negative for congestion, dental problem and drooling.    Eyes: Negative for photophobia, discharge and itching.   Respiratory: Negative for apnea and chest tightness.    Cardiovascular: Negative for chest pain, palpitations and leg swelling.   Gastrointestinal: Positive for abdominal pain. Negative for abdominal distention.   Endocrine: Negative for cold intolerance and heat intolerance.   Genitourinary: Negative for  difficulty urinating and dysuria.   Musculoskeletal: Negative for arthralgias and back pain.   Skin: Negative for color change and pallor.   Neurological: Negative for dizziness, facial asymmetry and headaches.   Hematological: Negative for adenopathy. Does not bruise/bleed easily.   Psychiatric/Behavioral: Negative for agitation, behavioral problems and confusion.     Objective:     Vital Signs (Most Recent):  Temp: 97.2 °F (36.2 °C) (03/15/19 0730)  Pulse: 65 (03/15/19 0730)  Resp: 18 (03/15/19 0730)  BP: (!) 162/93 (03/15/19 0730)  SpO2: 99 % (03/15/19 0730) Vital Signs (24h Range):  Temp:  [96.8 °F (36 °C)-99.7 °F (37.6 °C)] 97.2 °F (36.2 °C)  Pulse:  [51-74] 65  Resp:  [18] 18  SpO2:  [97 %-99 %] 99 %  BP: (121-162)/(68-97) 162/93     Weight: 129.9 kg (286 lb 6 oz)  Body mass index is 39.94 kg/m².    Physical Exam   Constitutional: He is oriented to person, place, and time. He appears well-developed and well-nourished.   HENT:   Head: Normocephalic and atraumatic.   Eyes: EOM are normal. Pupils are equal, round, and reactive to light.   Neck: Normal range of motion. Neck supple. No thyromegaly present.   Cardiovascular: Normal rate and regular rhythm.   No murmur heard.  Pulmonary/Chest: Effort normal and breath sounds normal. No respiratory distress.   Abdominal: Soft. Bowel sounds are normal. He exhibits no distension. There is tenderness in the right lower quadrant.       Musculoskeletal: Normal range of motion. He exhibits no edema.   Neurological: He is alert and oriented to person, place, and time. No cranial nerve deficit.   Skin: Skin is warm. Capillary refill takes less than 2 seconds. No rash noted. He is not diaphoretic. No erythema.   Psychiatric: He has a normal mood and affect.       Significant Labs:  CBC:   Recent Labs   Lab 03/14/19 2006   WBC 11.06   RBC 5.65   HGB 16.0   HCT 45.8      MCV 81*   MCH 28.3   MCHC 34.9     BMP:   Recent Labs   Lab 03/14/19  2006         K 3.6       CO2 26   BUN 15   CREATININE 0.9   CALCIUM 9.2     CMP:   Recent Labs   Lab 03/14/19 2006      CALCIUM 9.2      K 3.6   CO2 26      BUN 15   CREATININE 0.9     LFTs: No results for input(s): ALT, AST, ALKPHOS, BILITOT, PROT, ALBUMIN in the last 168 hours.  Coagulation: No results for input(s): LABPROT, INR, APTT in the last 168 hours.  Specimen (12h ago, onward)    None        Recent Labs   Lab 03/14/19 2037   COLORU Yellow   SPECGRAV >=1.030*   PHUR 6.0   PROTEINUA Negative   NITRITE Negative   LEUKOCYTESUR Negative   UROBILINOGEN Negative       Significant Diagnostics:  CT: I have reviewed all pertinent results/findings within the past 24 hours and my personal findings are:  Evidence of acute appendicitis with inflammation around the appendiceal tip.    Assessment:   Elpidio Dimas is a 38 y.o. male who presents with Acute appendicitis with generalized peritonitis.    Active Diagnoses:    Diagnosis Date Noted POA    PRINCIPAL PROBLEM:  Acute appendicitis with generalized peritonitis [K35.20] 03/14/2019 Yes      Problems Resolved During this Admission:     VTE Risk Mitigation (From admission, onward)        Ordered     IP VTE LOW RISK PATIENT  Once      03/14/19 2256     Place RANDOLPH hose  Until discontinued      03/14/19 2256          Medical Decision Making/Plan:  Patient with evidence of acute appendicitis.  Agree with IV fluid and antibiotic administration.  I recommended to the patient surgical appendectomy.  Risk and benefits of appendectomy were discussed in detail in the patient's hospital room with the patient and family.  Risk of conversion open procedure was discussed.  Risk of bleeding, injury to surrounding structures, need for further operations is also discussed.  After informed risk and benefits in counseling session, the patient's and family's questions were all answered.  They wish for me to proceed today with surgical appendectomy and appendix.  Will plan for  laparoscopic versus open.    Zeferino Qureshi MD  General Surgery  [facility]

## 2019-03-15 NOTE — ANESTHESIA POSTPROCEDURE EVALUATION
"Anesthesia Post Evaluation    Patient: Elpidio Dimas    Procedure(s) Performed: Procedure(s) (LRB):  APPENDECTOMY, LAPAROSCOPIC (Right)    Final Anesthesia Type: general  Patient location during evaluation: PACU  Patient participation: Yes- Able to Participate  Level of consciousness: awake and alert  Post-procedure vital signs: reviewed and stable  Pain management: adequate  Airway patency: patent  PONV status at discharge: No PONV  Anesthetic complications: no      Cardiovascular status: blood pressure returned to baseline  Respiratory status: unassisted  Hydration status: euvolemic  Follow-up not needed.        Visit Vitals  BP (!) 147/75 (Patient Position: Sitting)   Pulse 71   Temp 36 °C (96.8 °F) (Axillary)   Resp 18   Ht 5' 11" (1.803 m)   Wt 129.7 kg (286 lb)   SpO2 97%   BMI 39.89 kg/m²       Pain/Nacho Score: Pain Rating Prior to Med Admin: 8 (3/15/2019  2:49 PM)  Nacho Score: 10 (3/15/2019  2:15 PM)        "

## 2019-03-15 NOTE — PLAN OF CARE
Problem: Adult Inpatient Plan of Care  Goal: Readiness for Transition of Care    Intervention: Mutually Develop Transition Plan   03/15/19 1530 03/15/19 1657   OTHER   Communicated expected length of stay with patient/caregiver yes --    Is patient able to care for self after discharge? Yes --    Who are your caregiver(s) and their phone number(s)? Yoselin Dimas spouse [phone] --    Patient currently receives home health services? --  No   (RETIRED) Discharge Needs Assessment   How many people do you have in your home that can help with your care after discharge? --  1   Discharge Needs Assessment   Readmission Within the Last 30 Days no previous admission in last 30 days --    Equipment Currently Used at Home none --    Transportation Anticipated family or friend will provide --    Social Work Plan   Patient/Family in Agreement with Plan yes --    Living Environment   Able to Return to Prior Arrangements yes --    (RETIRED) Current Health   Expected Length of Stay (days) 2 --    (RETIRED) Social Work Plan   Patient's perception of discharge disposition home or selfcare --

## 2019-03-15 NOTE — SUBJECTIVE & OBJECTIVE
Interval History:  Patient has done well over the p.m..  He is on the surgical schedule today to follow.  Patient is sitting in bed resting quietly his wife is in the room with him we discussed upcoming surgery.  Answered all questions for the patient and the wife and questions concerning work.  Continue current level of care unless clinical course changes.    Review of Systems   Constitutional: Positive for appetite change. Negative for activity change, chills, diaphoresis, fatigue, fever and unexpected weight change.   HENT: Negative for congestion, drooling, hearing loss and trouble swallowing.    Eyes: Negative for pain and visual disturbance.   Respiratory: Negative.  Negative for apnea, cough, choking, chest tightness, shortness of breath and wheezing.    Cardiovascular: Negative for chest pain, palpitations and leg swelling.   Gastrointestinal: Positive for abdominal pain. Negative for abdominal distention, blood in stool, constipation, diarrhea, nausea and vomiting.        Rlq abd pain over appendix   Endocrine: Negative for polydipsia, polyphagia and polyuria.   Genitourinary: Negative for difficulty urinating, dysuria and flank pain.   Musculoskeletal: Negative for arthralgias, back pain, gait problem, joint swelling, neck pain and neck stiffness.   Skin: Negative for color change, rash and wound.   Allergic/Immunologic: Negative for environmental allergies, food allergies and immunocompromised state.   Neurological: Negative for dizziness, syncope, weakness, numbness and headaches.   Hematological: Negative for adenopathy.   Psychiatric/Behavioral: Negative for agitation, confusion and sleep disturbance. The patient is not nervous/anxious.      Objective:     Vital Signs (Most Recent):  Temp: 97.2 °F (36.2 °C) (03/15/19 0730)  Pulse: 65 (03/15/19 0730)  Resp: 18 (03/15/19 0730)  BP: (!) 162/93 (03/15/19 0730)  SpO2: 99 % (03/15/19 0730) Vital Signs (24h Range):  Temp:  [96.8 °F (36 °C)-99.7 °F (37.6 °C)]  97.2 °F (36.2 °C)  Pulse:  [51-74] 65  Resp:  [18] 18  SpO2:  [97 %-99 %] 99 %  BP: (121-162)/(68-97) 162/93     Weight: 129.9 kg (286 lb 6 oz)  Body mass index is 39.94 kg/m².    Intake/Output Summary (Last 24 hours) at 3/15/2019 0763  Last data filed at 3/15/2019 0546  Gross per 24 hour   Intake 600 ml   Output --   Net 600 ml      Physical Exam   Constitutional: He is oriented to person, place, and time. He appears well-developed and well-nourished.   HENT:   Head: Normocephalic and atraumatic.   Right Ear: External ear normal.   Left Ear: External ear normal.   Nose: Nose normal.   Eyes: Conjunctivae, EOM and lids are normal. Pupils are equal, round, and reactive to light.   Neck: Trachea normal, normal range of motion and full passive range of motion without pain. Neck supple.   Cardiovascular: Normal rate, regular rhythm, S1 normal, S2 normal, normal heart sounds, intact distal pulses and normal pulses.   Pulmonary/Chest: Effort normal and breath sounds normal.   Abdominal: Soft. Normal aorta and bowel sounds are normal. There is tenderness. There is rebound and guarding.   RLQ   Musculoskeletal: Normal range of motion.   Neurological: He is alert and oriented to person, place, and time. He has normal reflexes.   Skin: Skin is warm, dry and intact.   Psychiatric: He has a normal mood and affect. His speech is normal and behavior is normal. Judgment and thought content normal. Cognition and memory are normal.   Nursing note and vitals reviewed.      Significant Labs:   BMP:   Recent Labs   Lab 03/14/19 2006         K 3.6      CO2 26   BUN 15   CREATININE 0.9   CALCIUM 9.2     CBC:   Recent Labs   Lab 03/14/19 2006   WBC 11.06   HGB 16.0   HCT 45.8        All pertinent labs within the past 24 hours have been reviewed.    Significant Imaging: I have reviewed and interpreted all pertinent imaging results/findings within the past 24 hours.

## 2019-03-15 NOTE — NURSING
0945: Patient to surgery via W/C  1420: Patient back to room from surgery via bed.  3 trochar sites noted to abdomen sealed with dermabond.  Ice pack in place.  Respirations are even and unlabored on room air.  No distress noted or verbalized by patient at this time.

## 2019-03-16 VITALS
SYSTOLIC BLOOD PRESSURE: 145 MMHG | HEART RATE: 68 BPM | WEIGHT: 272.13 LBS | TEMPERATURE: 98 F | DIASTOLIC BLOOD PRESSURE: 92 MMHG | BODY MASS INDEX: 38.1 KG/M2 | OXYGEN SATURATION: 100 % | RESPIRATION RATE: 18 BRPM | HEIGHT: 71 IN

## 2019-03-16 PROCEDURE — 63600175 PHARM REV CODE 636 W HCPCS: Performed by: INTERNAL MEDICINE

## 2019-03-16 PROCEDURE — G0378 HOSPITAL OBSERVATION PER HR: HCPCS

## 2019-03-16 PROCEDURE — 25000003 PHARM REV CODE 250: Performed by: SURGERY

## 2019-03-16 PROCEDURE — S0028 INJECTION, FAMOTIDINE, 20 MG: HCPCS | Performed by: EMERGENCY MEDICINE

## 2019-03-16 PROCEDURE — 25000003 PHARM REV CODE 250: Performed by: EMERGENCY MEDICINE

## 2019-03-16 PROCEDURE — 96361 HYDRATE IV INFUSION ADD-ON: CPT | Performed by: EMERGENCY MEDICINE

## 2019-03-16 PROCEDURE — 63600175 PHARM REV CODE 636 W HCPCS: Performed by: SURGERY

## 2019-03-16 PROCEDURE — 96376 TX/PRO/DX INJ SAME DRUG ADON: CPT | Performed by: EMERGENCY MEDICINE

## 2019-03-16 PROCEDURE — 96375 TX/PRO/DX INJ NEW DRUG ADDON: CPT | Performed by: EMERGENCY MEDICINE

## 2019-03-16 RX ADMIN — ERTAPENEM SODIUM 1 G: 1 INJECTION, POWDER, LYOPHILIZED, FOR SOLUTION INTRAMUSCULAR; INTRAVENOUS at 07:03

## 2019-03-16 RX ADMIN — FAMOTIDINE 20 MG: 10 INJECTION INTRAVENOUS at 08:03

## 2019-03-16 RX ADMIN — OXYCODONE HYDROCHLORIDE AND ACETAMINOPHEN 1 TABLET: 5; 325 TABLET ORAL at 06:03

## 2019-03-16 RX ADMIN — POTASSIUM CHLORIDE AND SODIUM CHLORIDE: 900; 150 INJECTION, SOLUTION INTRAVENOUS at 06:03

## 2019-03-16 RX ADMIN — OXYCODONE HYDROCHLORIDE AND ACETAMINOPHEN 1 TABLET: 5; 325 TABLET ORAL at 02:03

## 2019-03-16 RX ADMIN — MORPHINE SULFATE 2 MG: 4 INJECTION INTRAVENOUS at 08:03

## 2019-03-16 NOTE — NURSING
Discharge instructions reviewed with patient, verbalized understanding.  Patient understood he is to make a follow up appointment with Dr. Dalal in 1-2 weeks.  Patient ambulated out to private vehicle upon discharge accompanied by wife.  No distress noted or verbalized by patient at this time.

## 2019-03-16 NOTE — PLAN OF CARE
Problem: Pain Acute  Goal: Optimal Pain Control    Intervention: Develop Pain Management Plan   03/15/19 1949   Prevent or Manage Pain   Pain Management Interventions quiet environment facilitated;pillow support provided;cold applied;pain management plan reviewed with patient/caregiver

## 2019-03-16 NOTE — PLAN OF CARE
Problem: Pain Acute  Goal: Optimal Pain Control    Intervention: Develop Pain Management Plan   03/16/19 0038   Prevent or Manage Pain   Pain Management Interventions pain management plan reviewed with patient/caregiver;pillow support provided;quiet environment facilitated;other (see comments)  (ICE PACK ON ABD)

## 2019-03-16 NOTE — ASSESSMENT & PLAN NOTE
ivfs iv pain meds surgery consult  03/15/2019.  Patient is awaiting surgery at this time.  All questions for the patient and wife were answered.  Surgical findings will determine if the patient stays overnight for continued antibiotics.  Continue current level of care unless clinical course changes.  03/16/2019.  Patient is afebrile vital signs normal patient is doing very well.  Can be discharged home at this time follow-up with Dr. FALL General surgery.  I will follow up patient in the office also.

## 2019-03-16 NOTE — ED PROVIDER NOTES
Encounter Date: 3/14/2019       History     Chief Complaint   Patient presents with    Abdominal Pain     LOW MID ABD PAIN ONSET THIS MORNING, NOW RLQ, DENIES ANY OTHER SYMPTOMS     38-year-old male - referred to the emergency department by Dr Dalal - for feared appendicitis     Reports 18-20 hours of progressive pain in the right thao umbilical and right lower quadrant area - states that the onset of this pain and awakened him at 2:00 a.m.    Denies any acute nausea vomiting    No hematuria dysuria          Review of patient's allergies indicates:  No Known Allergies  History reviewed. No pertinent past medical history.  Past Surgical History:   Procedure Laterality Date    UNDESCENDED TESTICLE EXPLORATION       History reviewed. No pertinent family history.  Social History     Tobacco Use    Smoking status: Never Smoker    Smokeless tobacco: Never Used   Substance Use Topics    Alcohol use: Yes     Comment: OCCASIONAL    Drug use: No     Review of Systems   Constitutional: Negative.    HENT: Negative.    Respiratory: Negative.    Cardiovascular: Negative.    Gastrointestinal: Positive for abdominal pain. Negative for nausea and vomiting.   Genitourinary: Negative for difficulty urinating, dysuria, flank pain, hematuria, scrotal swelling and testicular pain.   Musculoskeletal: Negative.    Skin: Negative.    Hematological: Negative.    All other systems reviewed and are negative.      Physical Exam     Initial Vitals [03/14/19 1949]   BP Pulse Resp Temp SpO2   (!) 150/97 74 18 99.7 °F (37.6 °C) 98 %      MAP       --         Physical Exam    Nursing note and vitals reviewed.  Constitutional: He appears well-developed and well-nourished. He is not diaphoretic. No distress.   HENT:   Head: Normocephalic and atraumatic.   Mouth/Throat: Oropharynx is clear and moist.   Eyes: Conjunctivae and EOM are normal. Pupils are equal, round, and reactive to light.   Neck: Neck supple. Carotid bruit is not present. No  JVD present.   Cardiovascular: Normal rate, regular rhythm, normal heart sounds and intact distal pulses.  No extrasystoles are present.  Exam reveals no gallop and no friction rub.    No murmur heard.  Pulses:       Radial pulses are 2+ on the right side, and 2+ on the left side.   Pulmonary/Chest: Breath sounds normal. No respiratory distress. He has no decreased breath sounds. He has no wheezes. He has no rhonchi. He has no rales. He exhibits no tenderness.   Abdominal: Soft. Bowel sounds are normal. He exhibits no distension and no mass. There is tenderness in the right lower quadrant. There is tenderness at McBurney's point. There is no rebound and no guarding.   Musculoskeletal: Normal range of motion. He exhibits no edema or tenderness.   Neurological: He is alert and oriented to person, place, and time. He has normal strength. No sensory deficit.   Skin: Skin is warm and dry. Capillary refill takes less than 2 seconds. No rash noted. No erythema. No pallor.   Psychiatric: He has a normal mood and affect. His behavior is normal. Judgment and thought content normal.         ED Course   Procedures  Labs Reviewed   CBC W/ AUTO DIFFERENTIAL - Abnormal; Notable for the following components:       Result Value    MCV 81 (*)     MPV 9.1 (*)     All other components within normal limits   URINALYSIS, REFLEX TO URINE CULTURE - Abnormal; Notable for the following components:    Specific Gravity, UA >=1.030 (*)     Ketones, UA Trace (*)     All other components within normal limits    Narrative:     Preferred Collection Type->Urine, Clean Catch   BASIC METABOLIC PANEL          Imaging Results          CT Renal Stone Study ABD Pelvis WO (Final result)  Result time 03/14/19 20:34:22    Final result by Brannon Hernandez MD (03/14/19 20:34:22)                 Impression:      1. Mild splenomegaly.  2. Findings suspicious for early acute appendicitis.  Correlate clinically with possible fever and/or elevated white  count.  3. Small fat containing umbilical hernia.  Dr. Rollins ER notified 20:30 hours 03/14/2018.      Electronically signed by: Brannon Hernandez  Date:    03/14/2019  Time:    20:34             Narrative:    EXAMINATION:  CT RENAL STONE STUDY ABD PELVIS WO    CLINICAL HISTORY:  RLQ pain;    TECHNIQUE:  Low dose axial images, sagittal and coronal reformations were obtained from the lung bases to the pubic symphysis.  Contrast was not administered.    COMPARISON:  None    FINDINGS:  The lung bases are clear.  No pleural or pericardial effusions.    The liver is normal in size and attenuation.  The spleen is mildly enlarged.  The gallbladder, pancreas and adrenal glands are unremarkable.    Kidneys are normal in size and attenuation.  No renal calculi.  No changes of hydronephrosis.  No perinephric inflammatory change.    Air and stool throughout the loops of colon.  The appendix is enlarged with mild edematous change within the appendiceal wall with subtle early periappendiceal inflammatory change.  The appendix measures up to 1.1 cm in diameter.  These findings are suspicious for early acute appendicitis.  No resulting bowel obstruction.  No adjacent free intraperitoneal air.    Bladder is partially distended.  Prostate, seminal vesicles and rectum unremarkable.    No significant mesenteric or retroperitoneal lymphadenopathy.    Small fat containing umbilical hernia.                                 Medical Decision Making:   Clinical Tests:   Lab Tests: Ordered and Reviewed  Radiological Study: Ordered and Reviewed  Other:   I have discussed this case with another health care provider.       <> Summary of the Discussion: Dr Hernandez - Rad  Dr. Dalal IM  Dr Qureshi   Admitted for care and OR in am                             Clinical Impression:       ICD-10-CM ICD-9-CM   1. Acute appendicitis with generalized peritonitis, unspecified whether abscess present, unspecified whether gangrene present, unspecified  whether perforation present K35.20 540.0         Disposition:   Disposition: Placed in Observation  Condition: Stable                        Josue Rollins MD  03/16/19 0333

## 2019-03-16 NOTE — PLAN OF CARE
Problem: Adult Inpatient Plan of Care  Goal: Plan of Care Review  Outcome: Ongoing (interventions implemented as appropriate)   03/16/19 0040   Plan of Care Review   Plan of Care Reviewed With patient;spouse   Progress improving

## 2019-03-16 NOTE — DISCHARGE SUMMARY
[facility] Medicine  Discharge Summary      Patient Name: Elpidio Dimas  MRN: [id number]  Admission Date: 3/14/2019  Hospital Length of Stay: 0 days  Discharge Date and Time:  03/16/2019 6:51 AM  Attending Physician: Itz Dalal MD   Discharging Provider: Itz Dalal MD  Primary Care Provider: Itz Dalal MD      HPI:   Awoke this am with hard cramp rlq which has improved now 3/10     Procedure(s) (LRB):  APPENDECTOMY, LAPAROSCOPIC (Right)      Hospital Course:   ivfs iv pain meds surgery consult  03/15/2019.  Patient has been placed on the surgical schedule will have a laparoscopic appendectomy later today.  Patient on IV antibiotics CT scan x-rays and labs have been reviewed.  Discussed with patient and wife timing of surgical procedure today.  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  Depending on surgical findings at the time of surgery whether not the patient will be held over tonight to receive continued antibiotics.  03/16/2019.  Patient is stable afebrile having no problems.  Has had diet.  Can be discharged home today follow up in office with Dr. FALL General surgery.  and with me.     Consults:   Consults (From admission, onward)        Status Ordering Provider     Inpatient consult to General Surgery  Once     Provider:  Zeferino Qureshi MD    Completed ILYA WHITE          * Acute appendicitis with generalized peritonitis    ivfs iv pain meds surgery consult  03/15/2019.  Patient is awaiting surgery at this time.  All questions for the patient and wife were answered.  Surgical findings will determine if the patient stays overnight for continued antibiotics.  Continue current level of care unless clinical course changes.  03/16/2019.  Patient is afebrile vital signs normal patient  is doing very well.  Can be discharged home at this time follow-up with Dr. FALL General surgery.  I will follow up patient in the office also.       Final Active Diagnoses:    Diagnosis Date Noted POA    PRINCIPAL PROBLEM:  Acute appendicitis with generalized peritonitis [K35.20] 03/14/2019 Yes      Problems Resolved During this Admission:       Discharged Condition: good    Disposition: Home or Self Care    Follow Up:  Follow-up Information     Itz Dalal MD.    Specialties:  Hospitalist, Family Medicine, Internal Medicine, Cardiology  Contact information:  [address] [place] [id number] [phone]                 Patient Instructions:   No discharge procedures on file.    Significant Diagnostic Studies: Labs:   BMP:   Recent Labs   Lab 03/14/19 2006         K 3.6      CO2 26   BUN 15   CREATININE 0.9   CALCIUM 9.2   , CBC   Recent Labs   Lab 03/14/19 2006   WBC 11.06   HGB 16.0   HCT 45.8       and All labs within the past 24 hours have been reviewed    Pending Diagnostic Studies:     None         Medications:  Reconciled Home Medications:      Medication List      You have not been prescribed any medications.         Indwelling Lines/Drains at time of discharge:   Lines/Drains/Airways          None          Time spent on the discharge of patient: 30 minutes  Patient was seen and examined on the date of discharge and determined to be suitable for discharge.         Itz Dalal MD  Department of Hospital Medicine  [facility] Surg

## 2019-03-18 NOTE — PLAN OF CARE
03/18/19 1350   Final Note   Assessment Type Final Discharge Note   Anticipated Discharge Disposition Home   What phone number can be called within the next 1-3 days to see how you are doing after discharge? [phone]   Hospital Follow Up  Appt(s) scheduled? Yes   Discharge plans and expectations educations in teach back method with documentation complete? Yes   Called patient with follow up appointments. Demonstrated understanding by verbal feedback. Denies any discharge needs at this time.

## 2019-04-03 ENCOUNTER — OFFICE VISIT (OUTPATIENT)
Dept: SURGERY | Facility: CLINIC | Age: 39
End: 2019-04-03
Payer: COMMERCIAL

## 2019-04-03 VITALS
SYSTOLIC BLOOD PRESSURE: 156 MMHG | RESPIRATION RATE: 18 BRPM | BODY MASS INDEX: 39.06 KG/M2 | HEART RATE: 64 BPM | OXYGEN SATURATION: 98 % | WEIGHT: 279 LBS | HEIGHT: 71 IN | TEMPERATURE: 98 F | DIASTOLIC BLOOD PRESSURE: 84 MMHG

## 2019-04-03 DIAGNOSIS — Z09 POSTOP CHECK: Primary | ICD-10-CM

## 2019-04-03 PROCEDURE — 99024 PR POST-OP FOLLOW-UP VISIT: ICD-10-PCS | Mod: S$GLB,,, | Performed by: SURGERY

## 2019-04-03 PROCEDURE — 99024 POSTOP FOLLOW-UP VISIT: CPT | Mod: S$GLB,,, | Performed by: SURGERY

## 2019-04-03 NOTE — LETTER
April 3, 2019      [facility] - General Surgery  [address] [place] [address]  Phone: [phone]  Fax: [phone]       Patient: Elpidio Dimas   YOB: 1980  Date of Visit: 04/03/2019    To Whom It May Concern:    Jonathan Dimas  was at [facility] on 04/03/2019. He may return to work on 04/03/2019 with light weight restrictions, he cannot lift anything over 10 lbs. If you have any questions or concerns, or if I can be of further assistance, please do not hesitate to contact me.    Sincerely,        Amelie Roland LPN

## 2019-04-03 NOTE — PROGRESS NOTES
"General Surgery  [facility]  Follow-up    HPI/Follow-up exam:  Elpidio Dimas is a 38 y.o. male s/p appendectomy for acute appendicitis.  No issues since surgery.  Doing well today.  No fevers or recurrent abdominal pain.  No nausea or vomiting with normal tolerance of a regular diet and normal bowel function.  No new complaints today.    PHYSICAL EXAM:  BP (!) 156/84 (BP Location: Right arm, Patient Position: Sitting, BP Method: Medium (Automatic))   Pulse 64   Temp 98.2 °F (36.8 °C) (Oral)   Resp 18   Ht 5' 11" (1.803 m)   Wt 126.6 kg (279 lb)   SpO2 98%   BMI 38.91 kg/m²   Gen: Wd Wn male in NAD  Heent: Nc/At, MMM  Cv: RRR  Lung: Non-labored breathing, clear bilaterally  Abd: Soft, non-tender, non-distended, surgical sites c/d/i without s/s of infection  Ext: No cyanosis clubbing or edema    Pathology consistent with acute appendicitis without neoplasm or cancer.  Assessment:  Elpidio Dimas is a 38 y.o. male s/p laparoscopic appendectomy for acute appendicitis    Plan/Medical Decision Making:  Doing well today.  F/u prn.  No post-surgical issues, appropriate post-operative course.  Work restrictions for 2-4 weeks, no heavy lifting greater than 10 pounds.            "

## 2019-11-06 ENCOUNTER — LAB VISIT (OUTPATIENT)
Dept: LAB | Facility: HOSPITAL | Age: 39
End: 2019-11-06
Attending: INTERNAL MEDICINE
Payer: COMMERCIAL

## 2019-11-06 DIAGNOSIS — R10.10 UPPER ABDOMINAL PAIN: ICD-10-CM

## 2019-11-06 DIAGNOSIS — R19.7 DIARRHEA OF PRESUMED INFECTIOUS ORIGIN: Primary | ICD-10-CM

## 2019-11-06 LAB
ALBUMIN SERPL BCP-MCNC: 4.7 G/DL (ref 3.5–5.2)
ALP SERPL-CCNC: 78 U/L (ref 55–135)
ALT SERPL W/O P-5'-P-CCNC: 37 U/L (ref 10–44)
ANION GAP SERPL CALC-SCNC: 10 MMOL/L (ref 8–16)
AST SERPL-CCNC: 23 U/L (ref 10–40)
BASOPHILS # BLD AUTO: 0.02 K/UL (ref 0–0.2)
BASOPHILS NFR BLD: 0.2 % (ref 0–1.9)
BILIRUB SERPL-MCNC: 1.4 MG/DL (ref 0.1–1)
BUN SERPL-MCNC: 15 MG/DL (ref 6–20)
CALCIUM SERPL-MCNC: 9.4 MG/DL (ref 8.7–10.5)
CHLORIDE SERPL-SCNC: 102 MMOL/L (ref 95–110)
CHOLEST SERPL-MCNC: 163 MG/DL (ref 120–199)
CHOLEST/HDLC SERPL: 3.4 {RATIO} (ref 2–5)
CO2 SERPL-SCNC: 27 MMOL/L (ref 23–29)
CREAT SERPL-MCNC: 0.8 MG/DL (ref 0.5–1.4)
DIFFERENTIAL METHOD: ABNORMAL
EOSINOPHIL # BLD AUTO: 0.3 K/UL (ref 0–0.5)
EOSINOPHIL NFR BLD: 3 % (ref 0–8)
ERYTHROCYTE [DISTWIDTH] IN BLOOD BY AUTOMATED COUNT: 12.7 % (ref 11.5–14.5)
EST. GFR  (AFRICAN AMERICAN): >60 ML/MIN/1.73 M^2
EST. GFR  (NON AFRICAN AMERICAN): >60 ML/MIN/1.73 M^2
GLUCOSE SERPL-MCNC: 88 MG/DL (ref 70–110)
HCT VFR BLD AUTO: 44.2 % (ref 40–54)
HDLC SERPL-MCNC: 48 MG/DL (ref 40–75)
HDLC SERPL: 29.4 % (ref 20–50)
HGB BLD-MCNC: 15.3 G/DL (ref 14–18)
IMM GRANULOCYTES # BLD AUTO: 0.03 K/UL (ref 0–0.04)
IMM GRANULOCYTES NFR BLD AUTO: 0.3 % (ref 0–0.5)
LDLC SERPL CALC-MCNC: 100.8 MG/DL (ref 63–159)
LYMPHOCYTES # BLD AUTO: 3.5 K/UL (ref 1–4.8)
LYMPHOCYTES NFR BLD: 36.5 % (ref 18–48)
MCH RBC QN AUTO: 28.2 PG (ref 27–31)
MCHC RBC AUTO-ENTMCNC: 34.6 G/DL (ref 32–36)
MCV RBC AUTO: 81 FL (ref 82–98)
MONOCYTES # BLD AUTO: 0.5 K/UL (ref 0.3–1)
MONOCYTES NFR BLD: 5.2 % (ref 4–15)
NEUTROPHILS # BLD AUTO: 5.2 K/UL (ref 1.8–7.7)
NEUTROPHILS NFR BLD: 54.8 % (ref 38–73)
NONHDLC SERPL-MCNC: 115 MG/DL
NRBC BLD-RTO: 0 /100 WBC
PLATELET # BLD AUTO: 236 K/UL (ref 150–350)
PMV BLD AUTO: 8.8 FL (ref 9.2–12.9)
POTASSIUM SERPL-SCNC: 3.9 MMOL/L (ref 3.5–5.1)
PROT SERPL-MCNC: 7.7 G/DL (ref 6–8.4)
RBC # BLD AUTO: 5.43 M/UL (ref 4.6–6.2)
SODIUM SERPL-SCNC: 139 MMOL/L (ref 136–145)
TRIGL SERPL-MCNC: 71 MG/DL (ref 30–150)
TSH SERPL DL<=0.005 MIU/L-ACNC: 3.31 UIU/ML (ref 0.34–5.6)
WBC # BLD AUTO: 9.53 K/UL (ref 3.9–12.7)

## 2019-11-06 PROCEDURE — 87209 SMEAR COMPLEX STAIN: CPT

## 2019-11-06 PROCEDURE — 85025 COMPLETE CBC W/AUTO DIFF WBC: CPT

## 2019-11-06 PROCEDURE — 80061 LIPID PANEL: CPT

## 2019-11-06 PROCEDURE — 80053 COMPREHEN METABOLIC PANEL: CPT

## 2019-11-06 PROCEDURE — 84443 ASSAY THYROID STIM HORMONE: CPT

## 2019-11-06 PROCEDURE — 36415 COLL VENOUS BLD VENIPUNCTURE: CPT

## 2019-11-07 ENCOUNTER — OFFICE VISIT (OUTPATIENT)
Dept: SURGERY | Facility: CLINIC | Age: 39
End: 2019-11-07
Payer: COMMERCIAL

## 2019-11-07 VITALS
BODY MASS INDEX: 38.42 KG/M2 | DIASTOLIC BLOOD PRESSURE: 94 MMHG | TEMPERATURE: 99 F | WEIGHT: 283.63 LBS | OXYGEN SATURATION: 97 % | RESPIRATION RATE: 14 BRPM | SYSTOLIC BLOOD PRESSURE: 142 MMHG | HEART RATE: 70 BPM | HEIGHT: 72 IN

## 2019-11-07 DIAGNOSIS — R10.13 EPIGASTRIC PAIN: Primary | ICD-10-CM

## 2019-11-07 PROCEDURE — 99214 OFFICE O/P EST MOD 30 MIN: CPT | Mod: S$GLB,,, | Performed by: SURGERY

## 2019-11-07 PROCEDURE — 99214 PR OFFICE/OUTPT VISIT, EST, LEVL IV, 30-39 MIN: ICD-10-PCS | Mod: S$GLB,,, | Performed by: SURGERY

## 2019-11-07 RX ORDER — LIDOCAINE HYDROCHLORIDE 10 MG/ML
1 INJECTION, SOLUTION EPIDURAL; INFILTRATION; INTRACAUDAL; PERINEURAL ONCE
Status: DISCONTINUED | OUTPATIENT
Start: 2019-11-07 | End: 2019-11-12 | Stop reason: HOSPADM

## 2019-11-07 RX ORDER — SODIUM CHLORIDE 9 MG/ML
INJECTION, SOLUTION INTRAVENOUS CONTINUOUS
Status: CANCELLED | OUTPATIENT
Start: 2019-11-07

## 2019-11-07 NOTE — PATIENT INSTRUCTIONS
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
## 2019-11-07 NOTE — LETTER
November 7, 2019      Itz Dalal MD  [address]  [facility] [address] [place] [address]           [facility] - General Surgery  [address] [place] [address]  Phone: [phone]  Fax: [phone]          Patient: Elpidio Dimas   MR Number: [id number]   YOB: 1980   Date of Visit: 11/7/2019       Dear Dr. Itz Dalal:    Thank you for referring Elpidio Dimas to me for evaluation. Attached you will find relevant portions of my assessment and plan of care.    If you have questions, please do not hesitate to call me. I look forward to following Elpidio Dimas along with you.    Sincerely,    Zeferino Qureshi MD    Enclosure  CC:  No Recipients    If you would like to receive this communication electronically, please contact [email or web address] or [phone] to request more information on [employer] access.    For providers and/or their staff who would like to refer a patient to [facility], please contact us through our one-stop-shop provider referral line, [facility], at [phone].    If you feel you have received this communication in error or would no longer like to receive these types of communications, please e-mail [email or web address]
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
## 2019-11-08 LAB — O+P STL TRI STN: NORMAL

## 2019-11-12 ENCOUNTER — ANESTHESIA EVENT (OUTPATIENT)
Dept: SURGERY | Facility: HOSPITAL | Age: 39
End: 2019-11-12
Payer: COMMERCIAL

## 2019-11-12 ENCOUNTER — ANESTHESIA (OUTPATIENT)
Dept: SURGERY | Facility: HOSPITAL | Age: 39
End: 2019-11-12
Payer: COMMERCIAL

## 2019-11-12 ENCOUNTER — HOSPITAL ENCOUNTER (OUTPATIENT)
Facility: HOSPITAL | Age: 39
Discharge: HOME | End: 2019-11-12
Attending: SURGERY | Admitting: SURGERY
Payer: COMMERCIAL

## 2019-11-12 VITALS
DIASTOLIC BLOOD PRESSURE: 74 MMHG | HEIGHT: 72 IN | SYSTOLIC BLOOD PRESSURE: 115 MMHG | BODY MASS INDEX: 38.33 KG/M2 | HEART RATE: 51 BPM | WEIGHT: 283 LBS | TEMPERATURE: 98 F | RESPIRATION RATE: 16 BRPM | OXYGEN SATURATION: 98 %

## 2019-11-12 DIAGNOSIS — R10.13 EPIGASTRIC PAIN: ICD-10-CM

## 2019-11-12 PROCEDURE — 63600175 PHARM REV CODE 636 W HCPCS: Performed by: SURGERY

## 2019-11-12 PROCEDURE — 88305 TISSUE EXAM BY PATHOLOGIST: ICD-10-PCS | Mod: 26,,, | Performed by: PATHOLOGY

## 2019-11-12 PROCEDURE — 37000008 HC ANESTHESIA 1ST 15 MINUTES: Performed by: SURGERY

## 2019-11-12 PROCEDURE — 88305 TISSUE EXAM BY PATHOLOGIST: CPT | Mod: 26,,, | Performed by: PATHOLOGY

## 2019-11-12 PROCEDURE — 63600175 PHARM REV CODE 636 W HCPCS: Performed by: NURSE ANESTHETIST, CERTIFIED REGISTERED

## 2019-11-12 PROCEDURE — 43239 EGD BIOPSY SINGLE/MULTIPLE: CPT | Mod: ,,, | Performed by: SURGERY

## 2019-11-12 PROCEDURE — D9220A PRA ANESTHESIA: ICD-10-PCS | Mod: CRNA,,, | Performed by: NURSE ANESTHETIST, CERTIFIED REGISTERED

## 2019-11-12 PROCEDURE — D9220A PRA ANESTHESIA: Mod: ANES,,, | Performed by: ANESTHESIOLOGY

## 2019-11-12 PROCEDURE — D9220A PRA ANESTHESIA: Mod: CRNA,,, | Performed by: NURSE ANESTHETIST, CERTIFIED REGISTERED

## 2019-11-12 PROCEDURE — 37000009 HC ANESTHESIA EA ADD 15 MINS: Performed by: SURGERY

## 2019-11-12 PROCEDURE — 43239 PR EGD, FLEX, W/BIOPSY, SGL/MULTI: ICD-10-PCS | Mod: ,,, | Performed by: SURGERY

## 2019-11-12 PROCEDURE — 27201012 HC FORCEPS, HOT/COLD, DISP: Performed by: SURGERY

## 2019-11-12 PROCEDURE — 43239 EGD BIOPSY SINGLE/MULTIPLE: CPT | Performed by: SURGERY

## 2019-11-12 PROCEDURE — D9220A PRA ANESTHESIA: ICD-10-PCS | Mod: ANES,,, | Performed by: ANESTHESIOLOGY

## 2019-11-12 PROCEDURE — 88305 TISSUE EXAM BY PATHOLOGIST: CPT | Mod: 59 | Performed by: PATHOLOGY

## 2019-11-12 PROCEDURE — 25000003 PHARM REV CODE 250: Performed by: NURSE ANESTHETIST, CERTIFIED REGISTERED

## 2019-11-12 RX ORDER — SODIUM CHLORIDE, SODIUM LACTATE, POTASSIUM CHLORIDE, CALCIUM CHLORIDE 600; 310; 30; 20 MG/100ML; MG/100ML; MG/100ML; MG/100ML
INJECTION, SOLUTION INTRAVENOUS CONTINUOUS
Status: DISCONTINUED | OUTPATIENT
Start: 2019-11-12 | End: 2019-11-12 | Stop reason: HOSPADM

## 2019-11-12 RX ORDER — SODIUM CHLORIDE 9 MG/ML
INJECTION, SOLUTION INTRAVENOUS CONTINUOUS
Status: DISCONTINUED | OUTPATIENT
Start: 2019-11-12 | End: 2019-11-12 | Stop reason: HOSPADM

## 2019-11-12 RX ORDER — LIDOCAINE HYDROCHLORIDE 10 MG/ML
1 INJECTION, SOLUTION EPIDURAL; INFILTRATION; INTRACAUDAL; PERINEURAL ONCE
Status: DISCONTINUED | OUTPATIENT
Start: 2019-11-12 | End: 2019-11-12 | Stop reason: HOSPADM

## 2019-11-12 RX ORDER — SUCRALFATE 1 G/1
1 TABLET ORAL 4 TIMES DAILY
Qty: 120 TABLET | Refills: 0 | Status: SHIPPED | OUTPATIENT
Start: 2019-11-12

## 2019-11-12 RX ORDER — MIDAZOLAM HYDROCHLORIDE 1 MG/ML
INJECTION, SOLUTION INTRAMUSCULAR; INTRAVENOUS
Status: DISCONTINUED | OUTPATIENT
Start: 2019-11-12 | End: 2019-11-12

## 2019-11-12 RX ORDER — SODIUM CHLORIDE, SODIUM LACTATE, POTASSIUM CHLORIDE, CALCIUM CHLORIDE 600; 310; 30; 20 MG/100ML; MG/100ML; MG/100ML; MG/100ML
125 INJECTION, SOLUTION INTRAVENOUS CONTINUOUS
Status: DISCONTINUED | OUTPATIENT
Start: 2019-11-12 | End: 2019-11-12 | Stop reason: HOSPADM

## 2019-11-12 RX ORDER — PROPOFOL 10 MG/ML
VIAL (ML) INTRAVENOUS
Status: DISCONTINUED | OUTPATIENT
Start: 2019-11-12 | End: 2019-11-12

## 2019-11-12 RX ORDER — PANTOPRAZOLE SODIUM 40 MG/1
40 TABLET, DELAYED RELEASE ORAL DAILY
Qty: 30 TABLET | Refills: 6 | Status: SHIPPED | OUTPATIENT
Start: 2019-11-12 | End: 2020-11-11

## 2019-11-12 RX ORDER — GLYCOPYRROLATE 0.2 MG/ML
INJECTION INTRAMUSCULAR; INTRAVENOUS
Status: DISCONTINUED | OUTPATIENT
Start: 2019-11-12 | End: 2019-11-12

## 2019-11-12 RX ORDER — ONDANSETRON 2 MG/ML
4 INJECTION INTRAMUSCULAR; INTRAVENOUS DAILY PRN
Status: DISCONTINUED | OUTPATIENT
Start: 2019-11-12 | End: 2019-11-12 | Stop reason: HOSPADM

## 2019-11-12 RX ADMIN — PROPOFOL 20 MG: 10 INJECTION, EMULSION INTRAVENOUS at 09:11

## 2019-11-12 RX ADMIN — PROPOFOL 30 MG: 10 INJECTION, EMULSION INTRAVENOUS at 09:11

## 2019-11-12 RX ADMIN — PROPOFOL 100 MG: 10 INJECTION, EMULSION INTRAVENOUS at 09:11

## 2019-11-12 RX ADMIN — GLYCOPYRROLATE 0.2 MG: 0.2 INJECTION INTRAMUSCULAR; INTRAVENOUS at 09:11

## 2019-11-12 RX ADMIN — MIDAZOLAM HYDROCHLORIDE 2 MG: 1 INJECTION, SOLUTION INTRAMUSCULAR; INTRAVENOUS at 09:11

## 2019-11-12 RX ADMIN — SODIUM CHLORIDE: 0.9 INJECTION, SOLUTION INTRAVENOUS at 09:11

## 2019-11-12 RX ADMIN — PROPOFOL 40 MG: 10 INJECTION, EMULSION INTRAVENOUS at 09:11

## 2019-11-12 NOTE — ANESTHESIA POSTPROCEDURE EVALUATION
Anesthesia Post Evaluation    Patient: Elpidio Dimas    Procedure(s) Performed: Procedure(s) (LRB):  ESOPHAGOGASTRODUODENOSCOPY (EGD) (N/A)    Final Anesthesia Type: general  Patient location during evaluation: PACU  Patient participation: Yes- Able to Participate  Level of consciousness: awake and awake and alert  Post-procedure vital signs: reviewed and stable  Pain management: adequate  Airway patency: patent  PONV status at discharge: No PONV  Anesthetic complications: no      Cardiovascular status: blood pressure returned to baseline  Respiratory status: unassisted and spontaneous ventilation  Hydration status: euvolemic  Follow-up not needed.          Vitals Value Taken Time   /74 11/12/2019 10:32 AM   Temp 36.7 °C (98.1 °F) 11/12/2019 10:05 AM   Pulse 48 11/12/2019 10:45 AM   Resp 18 11/12/2019 10:45 AM   SpO2 99 % 11/12/2019 10:45 AM   Vitals shown include unvalidated device data.      Event Time     Out of Recovery 10:30:00          Pain/Nacho Score: Nacho Score: 10 (11/12/2019 10:30 AM)

## 2019-11-12 NOTE — [employer] PATIENT INSTRUCTIONS
Discharge Summary/Instructions after an Endoscopic Procedure  Patient Name: Elpidio Dimas  Patient MRN: [id number]  Patient YOB: 1980 Tuesday, November 12, 2019  Zeferino Qureshi MD  RESTRICTIONS:  During your procedure today, you received medications for sedation.  These   medications may affect your judgment, balance and coordination.  Therefore,   for 24 hours, you have the following restrictions:   - DO NOT drive a car, operate machinery, make legal/financial decisions,   sign important papers or drink alcohol.    ACTIVITY:  Today: no heavy lifting, straining or running due to procedural   sedation/anesthesia.  The following day: return to full activity including work.  DIET:  Eat and drink normally unless instructed otherwise.     TREATMENT FOR COMMON SIDE EFFECTS:  - Mild abdominal pain, nausea, belching, bloating or excessive gas:  rest,   eat lightly and use a heating pad.  - Sore Throat: treat with throat lozenges and/or gargle with warm salt   water.  - Because air was used during the procedure, expelling large amounts of air   from your rectum or belching is normal.  - If a bowel prep was taken, you may not have a bowel movement for 1-3 days.    This is normal.  SYMPTOMS TO WATCH FOR AND REPORT TO YOUR PHYSICIAN:  1. Abdominal pain or bloating, other than gas cramps.  2. Chest pain.  3. Back pain.  4. Signs of infection such as: chills or fever occurring within 24 hours   after the procedure.  5. Rectal bleeding, which would show as bright red, maroon, or black stools.   (A tablespoon of blood from the rectum is not serious, especially if   hemorrhoids are present.)  6. Vomiting.  7. Weakness or dizziness.  GO DIRECTLY TO THE NEAREST EMERGENCY ROOM IF YOU HAVE ANY OF THE FOLLOWING:      Difficulty breathing              Chills and/or fever over 101 F   Persistent vomiting and/or vomiting blood   Severe abdominal pain   Severe chest pain   Black, tarry stools   Bleeding- more than one  tablespoon   Any other symptom or condition that you feel may need urgent attention  Your doctor recommends these additional instructions:  If any biopsies were taken, your doctors clinic will contact you in 1 to 2   weeks with any results.  - Discharge patient to home (ambulatory).   - Resume previous diet.   - Continue present medications.   - Use Protonix (pantoprazole) 40 mg PO daily.   - Use sucralfate tablets 1 gram PO QID.   - Dishcarge home today follow-up surgery clinic in 2 weeks  For questions, problems or results please call your physician - Zeferino Qureshi MD at Work:  [phone].  [facility] EMERGENCY ROOM PHONE NUMBER: [phone]  IF A COMPLICATION OR EMERGENCY SITUATION ARISES AND YOU ARE UNABLE TO REACH   YOUR PHYSICIAN - GO DIRECTLY TO THE EMERGENCY ROOM.  MD Zeferino Nelson MD  11/12/2019 9:58:28 AM  This report has been verified and signed electronically.  [employer]

## 2019-11-12 NOTE — TRANSFER OF CARE
Anesthesia Transfer of Care Note    Patient: Elpidio Dimas    Procedure(s) Performed: Procedure(s) (LRB):  ESOPHAGOGASTRODUODENOSCOPY (EGD) (N/A)    Patient location: PACU    Anesthesia Type: general    Transport from OR: Transported from OR on room air with adequate spontaneous ventilation    Post pain: adequate analgesia    Post assessment: no apparent anesthetic complications and tolerated procedure well    Post vital signs: stable    Level of consciousness: awake, alert and oriented    Nausea/Vomiting: no nausea/vomiting    Complications: none    Transfer of care protocol was followed      Last vitals:   Visit Vitals  BP (!) 145/89 (BP Location: Right arm, Patient Position: Lying)   Pulse 66   Temp 36.9 °C (98.4 °F) (Oral)   Resp 20   Ht 6' (1.829 m)   Wt 128.4 kg (283 lb)   SpO2 100%   BMI 38.38 kg/m²

## 2019-11-12 NOTE — DISCHARGE SUMMARY
Discharge Note        SUMMARY     Admit Date: 11/12/2019    Attending Physician: Zeferino Qureshi MD     Discharge Physician: Zeferino Qureshi MD    Discharge Date: 11/12/2019 9:52 AM      Hospital Course: Patient tolerated procedure well.     Disposition: Home or Self Care    Patient Instructions:   Current Discharge Medication List      START taking these medications    Details   pantoprazole (PROTONIX) 40 MG tablet Take 1 tablet (40 mg total) by mouth once daily.  Qty: 30 tablet, Refills: 6      sucralfate (CARAFATE) 1 gram tablet Take 1 tablet (1 g total) by mouth 4 (four) times daily.  Qty: 120 tablet, Refills: 0             Discharge Procedure Orders (must include Diet, Follow-up, Activity):   Discharge Procedure Orders (must include Diet, Follow-up, Activity)   Diet general     Call MD for:  temperature >100.4     Call MD for:  persistent nausea and vomiting     Call MD for:  severe uncontrolled pain     Call MD for:  difficulty breathing, headache or visual disturbances     Call MD for:  redness, tenderness, or signs of infection (pain, swelling, redness, odor or green/yellow discharge around incision site)     Call MD for:  persistent dizziness or light-headedness        Follow Up:  Follow up as scheduled.  Resume routine diet.  Activity as tolerated.    No driving day of procedure.

## 2019-11-12 NOTE — ANESTHESIA PREPROCEDURE EVALUATION
11/12/2019  Elpidio Dimas is a 38 y.o., male.    Pre-op Assessment    I have reviewed the Patient Summary Reports.    I have reviewed the Nursing Notes.   I have reviewed the Medications.     Review of Systems  Anesthesia Hx:  No problems with previous Anesthesia  Neg history of prior surgery. Denies Family Hx of Anesthesia complications.   Denies Personal Hx of Anesthesia complications.   Social:  Non-Smoker    Hematology/Oncology:  Hematology Normal   Oncology Normal     EENT/Dental:EENT/Dental Normal   Cardiovascular:  Cardiovascular Normal     Pulmonary:  Pulmonary Normal    Renal/:  Renal/ Normal     Hepatic/GI:  Hepatic/GI Normal    Musculoskeletal:  Musculoskeletal Normal    Neurological:  Neurology Normal    Endocrine:  Endocrine Normal    Dermatological:  Skin Normal    Psych:  Psychiatric Normal           Physical Exam  General:  Well nourished    Airway/Jaw/Neck:  Airway Findings: Mouth Opening: Normal Tongue: Normal  General Airway Assessment: Adult  Mallampati: II  TM Distance: 4 - 6 cm        Eyes/Ears/Nose:  EYES/EARS/NOSE FINDINGS: Normal   Dental:  DENTAL FINDINGS: Normal   Chest/Lungs:  Chest/Lungs Clear    Heart/Vascular:  Heart Findings: Normal Heart murmur: negative Vascular Findings: Normal    Abdomen:  Abdomen Findings: Normal    Musculoskeletal:  Musculoskeletal Findings: Normal   Skin:  Skin Findings: Normal    Mental Status:  Mental Status Findings: Normal        Anesthesia Plan  Type of Anesthesia, risks & benefits discussed:  Anesthesia Type:  general  Patient's Preference:   Intra-op Monitoring Plan: standard ASA monitors  Intra-op Monitoring Plan Comments:   Post Op Pain Control Plan:   Post Op Pain Control Plan Comments:   Induction:   IV  Beta Blocker:  Patient is not currently on a Beta-Blocker (No further documentation required).       Informed Consent: Patient  understands risks and agrees with Anesthesia plan.  Questions answered. Anesthesia consent signed with patient.  ASA Score: 1     Day of Surgery Review of History & Physical:    H&P update referred to the provider.

## 2019-11-12 NOTE — INTERVAL H&P NOTE
The patient has been examined and the H&P has been reviewed:    I concur with the findings and no changes have occurred since H&P was written.    Surgery risks, benefits and alternative options discussed and understood by patient/family.          Active Hospital Problems    Diagnosis  POA    Epigastric pain [R10.13]  Yes      Resolved Hospital Problems   No resolved problems to display.

## 2019-11-17 LAB
FINAL PATHOLOGIC DIAGNOSIS: NORMAL
GROSS: NORMAL

## 2019-11-19 ENCOUNTER — HOSPITAL ENCOUNTER (OUTPATIENT)
Dept: RADIOLOGY | Facility: HOSPITAL | Age: 39
Discharge: HOME | End: 2019-11-19
Attending: SURGERY
Payer: COMMERCIAL

## 2019-11-19 DIAGNOSIS — R10.13 EPIGASTRIC PAIN: ICD-10-CM

## 2019-11-19 PROCEDURE — 76705 ECHO EXAM OF ABDOMEN: CPT | Mod: TC,PN

## 2019-11-19 PROCEDURE — 76705 ECHO EXAM OF ABDOMEN: CPT | Mod: 26,,, | Performed by: RADIOLOGY

## 2019-11-19 PROCEDURE — 76705 US ABDOMEN LIMITED: ICD-10-PCS | Mod: 26,,, | Performed by: RADIOLOGY

## 2019-11-21 ENCOUNTER — OFFICE VISIT (OUTPATIENT)
Dept: SURGERY | Facility: CLINIC | Age: 39
End: 2019-11-21
Payer: COMMERCIAL

## 2019-11-21 VITALS
RESPIRATION RATE: 12 BRPM | TEMPERATURE: 98 F | BODY MASS INDEX: 38.97 KG/M2 | OXYGEN SATURATION: 98 % | DIASTOLIC BLOOD PRESSURE: 83 MMHG | WEIGHT: 287.69 LBS | HEART RATE: 73 BPM | SYSTOLIC BLOOD PRESSURE: 131 MMHG | HEIGHT: 72 IN

## 2019-11-21 DIAGNOSIS — K29.70 GASTRITIS, PRESENCE OF BLEEDING UNSPECIFIED, UNSPECIFIED CHRONICITY, UNSPECIFIED GASTRITIS TYPE: Primary | ICD-10-CM

## 2019-11-21 PROCEDURE — 99213 OFFICE O/P EST LOW 20 MIN: CPT | Mod: S$GLB,,, | Performed by: SURGERY

## 2019-11-21 PROCEDURE — 99213 PR OFFICE/OUTPT VISIT, EST, LEVL III, 20-29 MIN: ICD-10-PCS | Mod: S$GLB,,, | Performed by: SURGERY

## 2019-11-23 NOTE — PROGRESS NOTES
[facility] Surgery  Follow-up    Subjective:       Patient ID: Elpidio Dimas is a 38 y.o. male.    Chief Complaint: Follow-up (EGD 11-12-19/ US 11-19-19)      HPI:  Elpidio Dimas is a 38 y.o. male presents today for follow-up examination after EGD for abdominal pain. Patient also underwent ultrasound for gallbladder.  Ultrasound gallbladder negative for cholelithiasis.  EGD showed evidence of pre-pyloric ulceration within the stomach.  Biopsy showed gastritis without Helicobacter pylori.  Patient since EGD has been initiated on Protonix and Carafate.  Resolution of the patient's symptoms.  No new issues or complaints today.    Review of Systems   Constitutional: Negative for appetite change, chills and fever.   HENT: Negative for congestion, dental problem and drooling.    Eyes: Negative for photophobia, discharge and itching.   Respiratory: Negative for apnea and chest tightness.    Cardiovascular: Negative for chest pain, palpitations and leg swelling.   Gastrointestinal: Negative for abdominal distention and abdominal pain.   Endocrine: Negative for cold intolerance and heat intolerance.   Genitourinary: Negative for difficulty urinating and dysuria.   Musculoskeletal: Negative for arthralgias and back pain.   Skin: Negative for color change and pallor.   Neurological: Negative for dizziness, facial asymmetry and headaches.   Hematological: Negative for adenopathy. Does not bruise/bleed easily.   Psychiatric/Behavioral: Negative for agitation, behavioral problems and confusion.       Objective:      Vitals:    11/21/19 1526   BP: 131/83   BP Location: Left arm   Patient Position: Sitting   BP Method: Large (Automatic)   Pulse: 73   Resp: 12   Temp: 97.5 °F (36.4 °C)   TempSrc: Oral   SpO2: 98%   Weight: 130.5 kg (287 lb 11.2 oz)   Height: 6' (1.829 m)     Physical Exam   Constitutional: He is oriented to person, place, and time. He appears well-developed and well-nourished.   HENT:   Head:  Normocephalic and atraumatic.   Eyes: Pupils are equal, round, and reactive to light. EOM are normal.   Neck: Normal range of motion. Neck supple. No thyromegaly present.   Cardiovascular: Normal rate and regular rhythm.   No murmur heard.  Pulmonary/Chest: Effort normal and breath sounds normal. No respiratory distress.   Abdominal: Soft. Bowel sounds are normal. He exhibits no distension. There is no tenderness.   Musculoskeletal: Normal range of motion. He exhibits no edema.   Neurological: He is alert and oriented to person, place, and time. No cranial nerve deficit.   Skin: Skin is warm. Capillary refill takes less than 2 seconds. No rash noted. He is not diaphoretic. No erythema.   Psychiatric: He has a normal mood and affect.       Lab Review:   CBC:   Lab Results   Component Value Date    WBC 9.53 11/06/2019    RBC 5.43 11/06/2019    HGB 15.3 11/06/2019    HCT 44.2 11/06/2019     11/06/2019     BMP:   Lab Results   Component Value Date    GLU 88 11/06/2019     11/06/2019    K 3.9 11/06/2019     11/06/2019    CO2 27 11/06/2019    BUN 15 11/06/2019    CREATININE 0.8 11/06/2019    CALCIUM 9.4 11/06/2019     Diagnostics Review: US: Reviewed     Assessment:       1. Gastritis, presence of bleeding unspecified, unspecified chronicity, unspecified gastritis type        Plan:   Gastritis, presence of bleeding unspecified, unspecified chronicity, unspecified gastritis type        Medical Decision Making/Counseling:  Established patient.  New problem.    Gastritis.  New diagnosis.  Ulceration the pre-pyloric region.  Healing.  Initiation of PPI therapy and Carafate has significantly improved the patient's symptoms.  Resolution the patient's previous symptoms.  This likely related to gastritis.    Ultrasound the gallbladder has been reviewed.  Ultrasound negative for cholelithiasis.  With resolution of patient's symptoms, no indication for further evaluation with HIDA scan this point.  Patient has  recurrence of symptoms he will need HIDA scan evaluation the gallbladder further workup.    Follow up:  As needed.    Patient instructed that best way to communicate with my office staff is for patient to get on the [facility] epic patient portal to expedite communication and communication issues that may occur.  Patient was given instructions on how to get on the portal.  I encouraged patient to obtain portal access as well.  Ultimately it is up to the patient to obtain access.  Patient voiced understanding.

## (undated) DEVICE — RELOAD ENDO GIA TRISTAPLE 45MM

## (undated) DEVICE — STAPLER GIA HANDLE STD

## (undated) DEVICE — KIT ENDO CARRY ON PROCEDURE

## (undated) DEVICE — ELECTRODE REM PLYHSV RETURN 9

## (undated) DEVICE — SEALER LIGASURE LAP 37CM 5MM

## (undated) DEVICE — SUT CTD VICRYL 3-0 CR/SH

## (undated) DEVICE — SUT 0 VICRYL / UR6 (J603)

## (undated) DEVICE — CANNULA LAP SEAL Z THRD 5X100

## (undated) DEVICE — CLIPPER BLADE MOD 4406 (CAREF)

## (undated) DEVICE — BAG TISS RETRV MONARCH 10MM

## (undated) DEVICE — SOL CLEARIFY VISUALIZATION LAP

## (undated) DEVICE — IRRIGATOR SUCTION W/TIP

## (undated) DEVICE — SCISSOR TIP ENDOCUT DISPOSABLE

## (undated) DEVICE — CANISTER SUCTION 3000CC

## (undated) DEVICE — SLEEVE SCD EXPRESS CALF MEDIUM

## (undated) DEVICE — DRAPE ABDOMINAL TIBURON 14X11

## (undated) DEVICE — TROCAR KII BLLN 12MM 10CM

## (undated) DEVICE — SOL NACL IRR 3000ML

## (undated) DEVICE — GLOVE SURG ULTRA TOUCH 7.5

## (undated) DEVICE — SOL IRRI STRL WATER 1000ML

## (undated) DEVICE — ADHESIVE DERMABOND ADVANCED

## (undated) DEVICE — TUBING HEATED INSUFFLATOR

## (undated) DEVICE — SEE L#153236

## (undated) DEVICE — SEE MEDLINE ITEM 156964

## (undated) DEVICE — TROCAR ENDO Z THREAD KII 5X100

## (undated) DEVICE — BLADE EZ CLEAN 2.5IN MODIFIED

## (undated) DEVICE — SYR SLIP TIP 5CC

## (undated) DEVICE — SUT CTD VICRYL 4-0 BR PS-2